# Patient Record
Sex: FEMALE | Race: WHITE | Employment: FULL TIME | URBAN - METROPOLITAN AREA
[De-identification: names, ages, dates, MRNs, and addresses within clinical notes are randomized per-mention and may not be internally consistent; named-entity substitution may affect disease eponyms.]

---

## 2017-06-06 ENCOUNTER — APPOINTMENT (OUTPATIENT)
Dept: PHYSICAL THERAPY | Age: 37
End: 2017-06-06
Payer: COMMERCIAL

## 2017-06-07 ENCOUNTER — HOSPITAL ENCOUNTER (OUTPATIENT)
Dept: PHYSICAL THERAPY | Age: 37
Discharge: HOME | End: 2017-06-07
Payer: COMMERCIAL

## 2017-06-14 ENCOUNTER — HOSPITAL ENCOUNTER (OUTPATIENT)
Dept: PHYSICAL THERAPY | Age: 37
Discharge: HOME | End: 2017-06-14
Payer: COMMERCIAL

## 2017-06-14 PROCEDURE — 97162 PT EVAL MOD COMPLEX 30 MIN: CPT

## 2017-06-15 NOTE — PROGRESS NOTES
Emily Forte  : 1980 [facility] [address]  [phone]   [phone]        OUTPATIENT PHYSICAL THERAPY:Initial Assessment 2017    ICD-10: Treatment Diagnosis: low back pain M54.5  ICD-10: Treatment Diagnosis: difficulty walking R26.2  ICD-10: Treatment Diagnosis: stiffness in joint; right foot M25.674  Precautions/Allergies:   Sulfa (sulfonamide antibiotics)   Fall Risk Score: 0 (? 5 = High Risk)  MD Orders: self referred MEDICAL/REFERRING DIAGNOSIS:  Foot pain [M79.673]   DATE OF ONSET: chronic  REFERRING PHYSICIAN: Referred, Self, MD  RETURN PHYSICIAN APPOINTMENT: as needed     INITIAL ASSESSMENT:  Ms. Concha Urena presents to physical therapy with history of low back pain/right buttock pain and antalgic gait. She presents with limitations in RLE ROM, especially into her foot and ankle, with limited dorsiflexion. She has a history of a cavernous brainstem resulting in challenges with balance and gait, however patient is very motivated and demonstrates great strength through UE and LE. She is planning to join the [employer] and has one more orthopedic test and wants to work with physical therapy to improve her max potential. She would benefit from skilled physical therapy to improve her overall mobility, stability, balance and neuromuscular facilitation. PROBLEM LIST (Impacting functional limitations):  1. Decreased Strength  2. Decreased ADL/Functional Activities  3. Decreased Ambulation Ability/Technique  4. Decreased Balance INTERVENTIONS PLANNED:  1. Balance Exercise  2. Home Exercise Program (HEP)  3. Manual Therapy  4. Neuromuscular Re-education/Strengthening  5. Range of Motion (ROM)  6. Therapeutic Activites  7. Therapeutic Exercise/Strengthening   TREATMENT PLAN:  Effective Dates: 17 TO 17.   Frequency/Duration: 2 times a week for 4 weeks, (may only attend 1x a week secondary to her and therapist schedule/availability)  GOALS: (Goals have been discussed and agreed upon with patient.)  Discharge Goals: Time Frame: 4 weeks  1. Patient demonstrates improved balance with ability to stand for 60 seconds single leg without loss of balance. 2. Patient able to to demonstrates improved pelvic mobility with gait, for 300' without verbal cueing or resistance from therapist.  3. Patient demonstrated ability to run 2 miles without onset of discomfort or loss of balance. 4. Patient able to perform balance exercises for 15 minutes with no more than 3 loss of balances. Rehabilitation Potential For Stated Goals: Excellent  Regarding Anusha Forte's therapy, I certify that the treatment plan above will be carried out by a therapist or under their direction. Thank you for this referral,  Yuliana Humphreys PT     Referring Physician Signature: Referred, Self, MD              Date                    The information in this section was collected on 6/14/17 (except where otherwise noted). HISTORY:   History of Present Injury/Illness (Reason for Referral):  Patient has history of a cavernous brainstem, history of stroke affecting her right side. She has a chronic history of intermittent low back pain and challenges with mobility into right foot/ankle. She is very motivated and performs daily exercises and strength training to improve her overall mobility and function. She has decided she wants to try out to join the [employer] and to date has passed 99% of her testing. She has one more test on 6/19/17 and wanted to come to therapy to continue to improve her mobility and strength, especially into her RLE. Patient denies dizziness, drop attacks, numbness/tingling, bowel/bladder dysfunction and/or unexplained weight gain/loss. Past Medical History/Comorbidities:   Ms. Edward Villegas  has a past medical history of Stroke [facility]).  She also has no past medical history of Abnormal glandular Papanicolaou smear of cervix; Acquired hypothyroidism; Adult physical abuse; Anemia NEC; Asthma; Chronic kidney disease, unspecified; Diabetes mellitus; Essential hypertension; Female infertility of unspecified origin; Heart abnormalities; Herpes simplex without mention of complication; Human immunodeficiency virus (HIV) disease ([facility] [address] OTHER MEDICAL; Liver disease; Phlebitis and thrombophlebitis of unspecified site; Postpartum depression; Psychiatric problem; Rhesus isoimmunization unspecified as to episode of care in pregnancy; Shock due to anesthesia not elsewhere classified; Sickle-cell disease, unspecified ([facility] [address] Systemic lupus erythematosus ([facility] [address] Unspecified breast disorder; Unspecified diseases of blood and blood-forming organs; or Unspecified epilepsy [facility] of [facility] Ms. Nurys Allen  has a past surgical history that includes neurological procedure unlisted. Social History/Living Environment:     Lives in a private home with her  and two children  Prior Level of Function/Work/Activity:  Patient is very active with daily workouts and running. She works full time with [employer]. Dominant Side:         BILATERAL  Current Medications:       Current Outpatient Prescriptions:     ibuprofen (MOTRIN) 400 mg tablet, take 1 Tab by mouth every four (4) hours as needed for Pain., Disp: 30 Tab, Rfl: 1     Date Last Reviewed:  6/14/2017     Number of Personal Factors/Comorbidities that affect the Plan of Care: 1-2: MODERATE COMPLEXITY   EXAMINATION:   Observation/Orthostatic Postural Assessment:          Patient denies any LE pain, paresthesia or symptoms. Patient denies any increase of symptoms with cough, sneeze or valsalva. Patient denies any saddle paresthesia or bowel/bladder deficits. Observation/Orthostatic Postural Assessment: Patient exhibits a increased lumbar lordosis and convex right type I curve in mid-thoracic spine. Lower extremity weight bearing is slight increased left. Observation of gait indicates challenges with pelvic mobility  (patient is a pelvic/hip walker versus an efficient trunk walker). Lower quadrant bony landmarks are right elevated compared to left. Leg swing for innominate mobility indicates bilateral limitations into innominate extension. Vertical compression test (VCT) for alignment is 4-. Elbow flexion test (EFT) for motor activation is 3. Soft tissue observation indicates restrictions noted in bilateral iliopsoas, hamstrings, right greater than left, piriformis right greater than left. Palpation/Tissue, Texture, Tension, Tonal and Length Abnormalities: Myofascial assessment indicates restrictions into thoracolumbar fascia. Muscle length testing in modified Harley position exhibits restricted right greater than left. Hamstring flexibility tested supine with straight leg raise (SLR) is restricted right greater than left. Piriformis length is restricted right greater than left. Quadriceps flexibility tested prone with heel to buttock is moderate restrictions right. Ely test is positive for rectus femoris tightness. Gastrocnemius and soleus flexibility are restricted right. Sacrotuberous ligament is right restrictions. Sacrococcygeal junction exhibits right rotated. Body of coccyx is flexed. ROM: Passive trunk rotation is 90% available to the R and 90% available to the L. Kinetic testing in standing including forward bend test is positive left. Marcher's test is positive left. Pelvic shear test is standing exhibits decreased excursion of bilateral shear with a hard end feel. Single plane active movements through lumbar spine in standing exhibit WFL, tightness into hamstrings, right greater than left. Functional squat for LE clearance is WFL.     AROM (PROM) Right Left   Hip flexion/Innominate flexion 90 90   Hip extension/Innominate extension 8 8   Hip external rotation (ER) 25 25   Hip internal rotation (IR) 20 20   Hip abduction 45 45   Strength: Lumbar protective mechanism (LPM) are present for initiation. LPM Strength */5   R anterior to posterior diagonal 3   L anterior to posterior diagonal 3   R posterior to anterior diagonal 4   L posterior to anterior diagonal 4     Manual Muscle Test (*/5) Right Left   Knee extension 4+ 5   Knee flexion 4+ 5   Hip flexion 4 5   Hip ER 4 5   Hip IR 4 5   Hip extension 4- 4   Hip abduction 4- 4   Hip adduction 5 5   Ankle DF 4- 5   Ankle PF 5 5   Core stability 4/5     Special Tests:  Darryn Amado test is negative. Scours test is negative  Neurological Screen:  Myotomes: Key muscle strength testing through bilateral LE is intact. Dermatomes: Sensation testing through bilateral lower quadrants for light touch is intact. Reflexes: Patellar (L4) and Achilles (S1) are 2+ and WFL. Neural tension tests: Passive straight leg raise (SLR) test is negative. Slump test is negative. Functional Mobility:  Overall demonstrates good mobility with functional tasks, challenged with balance in single limb stance       Body Structures Involved:  1. Nerves  2. Joints  3. Muscles Body Functions Affected:  1. Sensory/Pain  2. Neuromusculoskeletal  3. Movement Related Activities and Participation Affected:  1. Mobility  2. Community, Social and [place]   Number of elements (examined above) that affect the Plan of Care: 4+: HIGH COMPLEXITY   CLINICAL PRESENTATION:   Presentation: Evolving clinical presentation with changing clinical characteristics: MODERATE COMPLEXITY   CLINICAL DECISION MAKING:   Outcome Measure: Tool Used: Modified Oswestry Low Back Pain Questionnaire  Score:  Initial: 3/50  Most Recent: X/50 (Date: -- )   Interpretation of Score: Each section is scored on a 0-5 scale, 5 representing the greatest disability. The scores of each section are added together for a total score of 50.       Medical Necessity:   · Patient is expected to demonstrate progress in strength, range of motion, balance, coordination and functional technique to increase independence with ambulation and balance to prepare for her testing for the [facility] Reason for Services/Other Comments:  · Patient continues to require skilled intervention due to she would benefit from additional training in endurance of RLE and lumbar spine  and to improve overall mobility, stability and balance. .   Use of outcome tool(s) and clinical judgement create a POC that gives a: Questionable prediction of patient's progress: MODERATE COMPLEXITY            TREATMENT:   (In addition to Assessment/Re-Assessment sessions the following treatments were rendered)  Pre-treatment Symptoms/Complaints:  Patient has passed 99% of her requirements for her to enter the [facility], want to come to therapy to continue to improve her ability to perform stability and balance activities. Pain: Initial:   Pain Intensity 1: 4  Pain Location 1: Buttocks  Pain Orientation 1: Right  Post Session:  3/10     Assessment only today, no treatment provided. Treatment/Session Assessment:    · Response to Treatment:  Improved awareness of plan of care and activities we can address in therapy to improve stability, balance and neuromuscular components. · Compliance with Program/Exercises: compliant all of the time. · Recommendations/Intent for next treatment session: \"Next visit will focus on advancements to more challenging activities\".   Total Treatment Duration:  PT Patient Time In/Time Out  Time In: 0840  Time Out: One [facility] Olivia PT

## 2017-06-15 NOTE — PROGRESS NOTES
Ambulatory/Rehab Services H2 Model Falls Risk Assessment    Risk Factor Pts. ·   Confusion/Disorientation/Impulsivity  []    4 ·   Symptomatic Depression  []   2 ·   Altered Elimination  []   1 ·   Dizziness/Vertigo  []   1 ·   Gender (Male)  []   1 ·   Any administered antiepileptics (anticonvulsants):  []   2 ·   Any administered benzodiazepines:  []   1 ·   Visual Impairment (specify):  []   1 ·   Portable Oxygen Use  []   1 ·   Orthostatic ? BP  []   1 ·   History of Recent Falls (within 3 mos.)  []   5     Ability to Rise from Chair (choose one) Pts. ·   Ability to rise in a single movement  [x]   0 ·   Pushes up, successful in one attempt  []   1 ·   Multiple attempts, but successful  []   3 ·   Unable to rise without assistance  []   4   Total: (5 or greater = High Risk) 0     Falls Prevention Plan:   []                Physical Limitations to Exercise (specify):   []                Mobility Assistance Device (type):   []                Exercise/Equipment Adaptation (specify):    ©2010 [facility] of [address] Patent #[id number].  Federal Law prohibits the replication, distribution or use without written permission from [facility] [employer]

## 2017-06-20 ENCOUNTER — HOSPITAL ENCOUNTER (OUTPATIENT)
Dept: PHYSICAL THERAPY | Age: 37
Discharge: HOME | End: 2017-06-20
Payer: COMMERCIAL

## 2017-07-19 ENCOUNTER — HOSPITAL ENCOUNTER (OUTPATIENT)
Dept: PHYSICAL THERAPY | Age: 37
Discharge: HOME | End: 2017-07-19
Payer: COMMERCIAL

## 2017-07-19 PROCEDURE — 97140 MANUAL THERAPY 1/> REGIONS: CPT

## 2017-07-19 PROCEDURE — 97110 THERAPEUTIC EXERCISES: CPT

## 2017-07-20 NOTE — PROGRESS NOTES
Renita Forte  : 1980 [facility] [address]  [phone]   [phone]        OUTPATIENT PHYSICAL THERAPY:Daily Note and Recertification [id number]    ICD-10: Treatment Diagnosis: low back pain M54.5  ICD-10: Treatment Diagnosis: difficulty walking R26.2  ICD-10: Treatment Diagnosis: stiffness in joint; right foot M25.674  Precautions/Allergies:   Sulfa (sulfonamide antibiotics)   Fall Risk Score: 0 (? 5 = High Risk)  MD Orders: self referred MEDICAL/REFERRING DIAGNOSIS:  Foot pain [M79.673]   DATE OF ONSET: chronic  REFERRING PHYSICIAN: Madisyn De La Fuente MD  RETURN PHYSICIAN APPOINTMENT: as needed     RE-ASSESSMENT:  Ms. Lieutenant Moffett presents to physical therapy with history of low back pain/right buttock pain and antalgic gait. She presents with limitations in RLE ROM, especially into her foot and ankle, with limited dorsiflexion. She has a history of a cavernous brainstem resulting in challenges with balance and gait, however patient is very motivated and demonstrates great strength through UE and LE. She would benefit from skilled physical therapy to improve her overall mobility, stability, balance and neuromuscular facilitation. PROBLEM LIST (Impacting functional limitations):  1. Decreased Strength  2. Decreased ADL/Functional Activities  3. Decreased Ambulation Ability/Technique  4. Decreased Balance INTERVENTIONS PLANNED:  1. Balance Exercise  2. Home Exercise Program (HEP)  3. Manual Therapy  4. Neuromuscular Re-education/Strengthening  5. Range of Motion (ROM)  6. Therapeutic Activites  7. Therapeutic Exercise/Strengthening   TREATMENT PLAN:  Effective Dates: 17 TO 10/11/17  Frequency/Duration: 1x a week for 12 weeks  GOALS: (Goals have been discussed and agreed upon with patient.)  Discharge Goals: Time Frame: 4 weeks  1.  Patient demonstrates improved balance with ability to stand for 60 seconds single leg without loss of balance. - ONGOING  2. Patient able to to demonstrates improved pelvic mobility with gait, for 300' without verbal cueing or resistance from therapist. - ONGOING  3. Patient demonstrated ability to run 2 miles without onset of discomfort or loss of balance. - ONGOING  4. Patient able to perform balance exercises for 15 minutes with no more than 3 loss of balances. Rehabilitation Potential For Stated Goals: Excellent  Regarding Nicole Forte's therapy, I certify that the treatment plan above will be carried out by a therapist or under their direction. Thank you for this referral,  Jose Saez PT     Referring Physician Signature: Irene Colvin MD              Date                    The information in this section was collected on 7/19/17 (except where otherwise noted). HISTORY:   History of Present Injury/Illness (Reason for Referral):  Patient has history of a cavernous brainstem, history of stroke affecting her right side. She has a chronic history of intermittent low back pain and challenges with mobility into right foot/ankle. She is very motivated and performs daily exercises and strength training to improve her overall mobility and function. She has decided she wants to try out to join the [employer] and to date has passed 99% of her testing. :Unfortunately she was denied by the the [employer] reserves, however she continues to want to improve her overall strength and balance. She has also expressed interest in obtaining a new AFO. Patient denies dizziness, drop attacks, numbness/tingling, bowel/bladder dysfunction and/or unexplained weight gain/loss. Past Medical History/Comorbidities:   Ms. Manuel Kumar  has a past medical history of Stroke [facility]). She also has no past medical history of Abnormal glandular Papanicolaou smear of cervix; Acquired hypothyroidism; Adult physical abuse; Anemia NEC;  Asthma; Chronic kidney disease, unspecified; Diabetes mellitus; Essential hypertension; Female infertility of unspecified origin; Heart abnormalities; Herpes simplex without mention of complication; Human immunodeficiency virus (HIV) disease ([facility] [address] OTHER MEDICAL; Liver disease; Phlebitis and thrombophlebitis of unspecified site; Postpartum depression; Psychiatric problem; Rhesus isoimmunization unspecified as to episode of care in pregnancy; Shock due to anesthesia not elsewhere classified; Sickle-cell disease, unspecified ([facility] [address] Systemic lupus erythematosus ([facility] [address] Unspecified breast disorder; Unspecified diseases of blood and blood-forming organs; or Unspecified epilepsy without mention of intractable epilepsy ([facility] [address] Ms. Kay Kat  has a past surgical history that includes neurological procedure unlisted. Social History/Living Environment:     Lives in a private home with her  and two children  Prior Level of Function/Work/Activity:  Patient is very active with daily workouts and running. She works full time with [employer]. Dominant Side:         BILATERAL  Current Medications:       Current Outpatient Prescriptions:     ibuprofen (MOTRIN) 400 mg tablet, take 1 Tab by mouth every four (4) hours as needed for Pain., Disp: 30 Tab, Rfl: 1     Date Last Reviewed:  7/19/2017     Number of Personal Factors/Comorbidities that affect the Plan of Care: 1-2: MODERATE COMPLEXITY   EXAMINATION:   Observation/Orthostatic Postural Assessment:          Patient denies any LE pain, paresthesia or symptoms. Patient denies any increase of symptoms with cough, sneeze or valsalva. Patient denies any saddle paresthesia or bowel/bladder deficits. Observation/Orthostatic Postural Assessment: Patient exhibits a increased lumbar lordosis and convex right type I curve in mid-thoracic spine. Lower extremity weight bearing is slight increased left. Observation of gait indicates challenges with pelvic mobility  (patient is a pelvic/hip walker versus an efficient trunk walker).  Lower quadrant bony landmarks are right elevated compared to left. Leg swing for innominate mobility indicates bilateral limitations into innominate extension. Vertical compression test (VCT) for alignment is 4-. Elbow flexion test (EFT) for motor activation is 3. Soft tissue observation indicates restrictions noted in bilateral iliopsoas, hamstrings, right greater than left, piriformis right greater than left. Palpation/Tissue, Texture, Tension, Tonal and Length Abnormalities: Myofascial assessment indicates restrictions into thoracolumbar fascia. Muscle length testing in modified Harley position exhibits restricted right greater than left. Hamstring flexibility tested supine with straight leg raise (SLR) is restricted right greater than left. Piriformis length is restricted right greater than left. Quadriceps flexibility tested prone with heel to buttock is moderate restrictions right. Ely test is positive for rectus femoris tightness. Gastrocnemius and soleus flexibility are restricted right. Sacrotuberous ligament is right restrictions. Sacrococcygeal junction exhibits right rotated. Body of coccyx is flexed. ROM: Passive trunk rotation is 90% available to the R and 90% available to the L. Kinetic testing in standing including forward bend test is positive left. Marcher's test is positive left. Pelvic shear test is standing exhibits decreased excursion of bilateral shear with a hard end feel. Single plane active movements through lumbar spine in standing exhibit WFL, tightness into hamstrings, right greater than left. Functional squat for LE clearance is WFL. AROM (PROM) Right Left   Hip flexion/Innominate flexion 90 90   Hip extension/Innominate extension 8 8   Hip external rotation (ER) 25 25   Hip internal rotation (IR) 20 20   Hip abduction 45 45   Strength: Lumbar protective mechanism (LPM) are present for initiation.    LPM Strength */5   R anterior to posterior diagonal 3   L anterior to posterior diagonal 3   R posterior to anterior diagonal 4   L posterior to anterior diagonal 4     Manual Muscle Test (*/5) Right Left   Knee extension 4+ 5   Knee flexion 4+ 5   Hip flexion 4 5   Hip ER 4 5   Hip IR 4 5   Hip extension 4- 4   Hip abduction 4- 4   Hip adduction 5 5   Ankle DF 4- 5   Ankle PF 5 5   Core stability 4/5     Special Tests:  Milly Wayne test is negative. Scours test is negative  Neurological Screen:  Myotomes: Key muscle strength testing through bilateral LE is intact. Dermatomes: Sensation testing through bilateral lower quadrants for light touch is intact. Reflexes: Patellar (L4) and Achilles (S1) are 2+ and WFL. Neural tension tests: Passive straight leg raise (SLR) test is negative. Slump test is negative. Functional Mobility:  Overall demonstrates good mobility with functional tasks, challenged with balance in single limb stance       Body Structures Involved:  1. Nerves  2. Joints  3. Muscles Body Functions Affected:  1. Sensory/Pain  2. Neuromusculoskeletal  3. Movement Related Activities and Participation Affected:  1. Mobility  2. Community, Social and [place]   Number of elements (examined above) that affect the Plan of Care: 4+: HIGH COMPLEXITY   CLINICAL PRESENTATION:   Presentation: Evolving clinical presentation with changing clinical characteristics: MODERATE COMPLEXITY   CLINICAL DECISION MAKING:   Outcome Measure: Tool Used: Modified Oswestry Low Back Pain Questionnaire  Score:  Initial: 3/50  Most Recent: X/50 (Date: -- )   Interpretation of Score: Each section is scored on a 0-5 scale, 5 representing the greatest disability. The scores of each section are added together for a total score of 50. Medical Necessity:   · Patient is expected to demonstrate progress in strength, range of motion, balance, coordination and functional technique to increase independence with ambulation and balance to prepare for her testing for the [facility]   Reason for Services/Other Comments:  · Patient continues to require skilled intervention due to she would benefit from additional training in endurance of RLE and lumbar spine  and to improve overall mobility, stability and balance. .   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 She continues to note some tightness into her right LE and buttocks. She is also interested in a new AFO. Pain: Initial:   Pain Intensity 1: 4  Pain Location 1: Buttocks  Pain Orientation 1: Right  Post Session:  3/10     Therapeutic Exercise: (15 Minutes):  Exercises per grid below to improve mobility, strength, balance and coordination. Required minimal verbal and manual cues to promote proper body alignment, promote proper body posture, promote proper body mechanics and promote proper body breathing techniques. Progressed resistance, range, repetitions and complexity of movement as indicated. Date:  7/19/2017   Activity/Exercise Parameters   Balance exercises X 10 reps, tandem stance, with weight shift and weight acceptance into front LE   Single limb stance  3x30 sec holds   Quadriped posterior depression X 10 reps, 10 sec holds ball into wall   Treadmill ambulation X 3 minute of hip hinging forward walking with focus on posterior depression of pelvis. Review HEP X 5 minutes             Manual Therapy (    Soft Tissue Mobilization Duration  Duration: 45 Minutes): Manual techniques to facilitate improved motion and decreased pain.   · Supine bilateral iliopsoas release with FMP of lower trunk rotation  · Supine bilateral hamstrings, soft tissue mobilization right greater than left  · Prone sacral mobilizations, p/a with lower trunk rotation  · With written consent received and precautions reviewed, instrument-assisted soft tissue mobilization was performed to right gluteus medius, minimus and piriformis for the purpose of activation with a positive treatment effect and no complications noted. · Side lying left for right prolonged holds into posterior depression and manual resistance into anterior elevation of pelvis, with hip flexion, hip adduction and ER    (Used abbreviations: MET - muscle energy technique; PNF - proprioceptive neuromuscular facilitation; NMR - neuromuscular re-education; a/p - anterior to posterior; p/a - posterior to anterior, FMP - functional movement patterns)        Treatment/Session Assessment:    · Response to Treatment: improved activation of right gluteal muscles and improved endurance through pelvis. · Compliance with Program/Exercises: compliant all of the time. · Recommendations/Intent for next treatment session: \"Next visit will focus on advancements to more challenging activities\".   Total Treatment Duration:  PT Patient Time In/Time Out  Time In: 0730  Time Out: 1420 [facility] [place], PT

## 2017-07-25 ENCOUNTER — HOSPITAL ENCOUNTER (OUTPATIENT)
Dept: PHYSICAL THERAPY | Age: 37
Discharge: HOME | End: 2017-07-25
Payer: COMMERCIAL

## 2017-07-25 PROCEDURE — 97140 MANUAL THERAPY 1/> REGIONS: CPT

## 2017-07-25 PROCEDURE — 97110 THERAPEUTIC EXERCISES: CPT

## 2017-07-25 NOTE — PROGRESS NOTES
[facility] Forte  : 1980 [facility] [address]  [phone]   [phone]        OUTPATIENT PHYSICAL THERAPY:Daily Note 2017    ICD-10: Treatment Diagnosis: low back pain M54.5  ICD-10: Treatment Diagnosis: difficulty walking R26.2  ICD-10: Treatment Diagnosis: stiffness in joint; right foot M25.674  Precautions/Allergies:   Sulfa (sulfonamide antibiotics)   Fall Risk Score: 0 (? 5 = High Risk)  MD Orders: self referred MEDICAL/REFERRING DIAGNOSIS:  Foot pain [M79.673]   DATE OF ONSET: chronic  REFERRING PHYSICIAN: Andrew Millard MD  RETURN PHYSICIAN APPOINTMENT: as needed     RE-ASSESSMENT:  Ms. Opal Painter presents to physical therapy with history of low back pain/right buttock pain and antalgic gait. She presents with limitations in RLE ROM, especially into her foot and ankle, with limited dorsiflexion. She has a history of a cavernous brainstem resulting in challenges with balance and gait, however patient is very motivated and demonstrates great strength through UE and LE. She would benefit from skilled physical therapy to improve her overall mobility, stability, balance and neuromuscular facilitation. PROBLEM LIST (Impacting functional limitations):  1. Decreased Strength  2. Decreased ADL/Functional Activities  3. Decreased Ambulation Ability/Technique  4. Decreased Balance INTERVENTIONS PLANNED:  1. Balance Exercise  2. Home Exercise Program (HEP)  3. Manual Therapy  4. Neuromuscular Re-education/Strengthening  5. Range of Motion (ROM)  6. Therapeutic Activites  7. Therapeutic Exercise/Strengthening   TREATMENT PLAN:  Effective Dates: 17 TO 10/11/17  Frequency/Duration: 1x a week for 12 weeks  GOALS: (Goals have been discussed and agreed upon with patient.)  Discharge Goals: Time Frame: 4 weeks  1.  Patient demonstrates improved balance with ability to stand for 60 seconds single leg without loss of balance. - ONGOING  2. Patient able to to demonstrates improved pelvic mobility with gait, for 300' without verbal cueing or resistance from therapist. - ONGOING  3. Patient demonstrated ability to run 2 miles without onset of discomfort or loss of balance. - ONGOING  4. Patient able to perform balance exercises for 15 minutes with no more than 3 loss of balances. Rehabilitation Potential For Stated Goals: Excellent  Regarding Ashwin Forte's therapy, I certify that the treatment plan above will be carried out by a therapist or under their direction. Thank you for this referral,  Efraín Gonzlaes PT     Referring Physician Signature: Bianca Moura MD              Date                    The information in this section was collected on 7/19/17 (except where otherwise noted). HISTORY:   History of Present Injury/Illness (Reason for Referral):  Patient has history of a cavernous brainstem, history of stroke affecting her right side. She has a chronic history of intermittent low back pain and challenges with mobility into right foot/ankle. She is very motivated and performs daily exercises and strength training to improve her overall mobility and function. She has decided she wants to try out to join the [employer] and to date has passed 99% of her testing. :Unfortunately she was denied by the the [employer] reserves, however she continues to want to improve her overall strength and balance. She has also expressed interest in obtaining a new AFO. Patient denies dizziness, drop attacks, numbness/tingling, bowel/bladder dysfunction and/or unexplained weight gain/loss. Past Medical History/Comorbidities:   Ms. Richmond Moreno  has a past medical history of Stroke [facility]). She also has no past medical history of Abnormal glandular Papanicolaou smear of cervix; Acquired hypothyroidism; Adult physical abuse; Anemia NEC;  Asthma; Chronic kidney disease, unspecified; Diabetes mellitus; Essential hypertension; Female infertility of unspecified origin; Heart abnormalities; Herpes simplex without mention of complication; Human immunodeficiency virus (HIV) disease ([facility] OTHER MEDICAL; Liver disease; Phlebitis and thrombophlebitis of unspecified site; Postpartum depression; Psychiatric problem; Rhesus isoimmunization unspecified as to episode of care in pregnancy; Shock due to anesthesia not elsewhere classified; Sickle-cell disease, unspecified ([facility] [address] Systemic lupus erythematosus ([facility] [address] Unspecified breast disorder; Unspecified diseases of blood and blood-forming organs; or Unspecified epilepsy without mention of intractable epilepsy ([facility] Ms. Kristy Saucedo  has a past surgical history that includes neurological procedure unlisted. Social History/Living Environment:     Lives in a private home with her  and two children  Prior Level of Function/Work/Activity:  Patient is very active with daily workouts and running. She works full time with [employer]. Dominant Side:         BILATERAL  Current Medications:       Current Outpatient Prescriptions:     ibuprofen (MOTRIN) 400 mg tablet, take 1 Tab by mouth every four (4) hours as needed for Pain., Disp: 30 Tab, Rfl: 1     Date Last Reviewed:  7/25/2017     Number of Personal Factors/Comorbidities that affect the Plan of Care: 1-2: MODERATE COMPLEXITY   EXAMINATION:   Observation/Orthostatic Postural Assessment:          Patient denies any LE pain, paresthesia or symptoms. Patient denies any increase of symptoms with cough, sneeze or valsalva. Patient denies any saddle paresthesia or bowel/bladder deficits. Observation/Orthostatic Postural Assessment: Patient exhibits a increased lumbar lordosis and convex right type I curve in mid-thoracic spine. Lower extremity weight bearing is slight increased left. Observation of gait indicates challenges with pelvic mobility  (patient is a pelvic/hip walker versus an efficient trunk walker).  Lower quadrant bony landmarks are right elevated compared to left. Leg swing for innominate mobility indicates bilateral limitations into innominate extension. Vertical compression test (VCT) for alignment is 4-. Elbow flexion test (EFT) for motor activation is 3. Soft tissue observation indicates restrictions noted in bilateral iliopsoas, hamstrings, right greater than left, piriformis right greater than left. Palpation/Tissue, Texture, Tension, Tonal and Length Abnormalities: Myofascial assessment indicates restrictions into thoracolumbar fascia. Muscle length testing in modified Harley position exhibits restricted right greater than left. Hamstring flexibility tested supine with straight leg raise (SLR) is restricted right greater than left. Piriformis length is restricted right greater than left. Quadriceps flexibility tested prone with heel to buttock is moderate restrictions right. Ely test is positive for rectus femoris tightness. Gastrocnemius and soleus flexibility are restricted right. Sacrotuberous ligament is right restrictions. Sacrococcygeal junction exhibits right rotated. Body of coccyx is flexed. ROM: Passive trunk rotation is 90% available to the R and 90% available to the L. Kinetic testing in standing including forward bend test is positive left. Marcher's test is positive left. Pelvic shear test is standing exhibits decreased excursion of bilateral shear with a hard end feel. Single plane active movements through lumbar spine in standing exhibit WFL, tightness into hamstrings, right greater than left. Functional squat for LE clearance is WFL. AROM (PROM) Right Left   Hip flexion/Innominate flexion 90 90   Hip extension/Innominate extension 8 8   Hip external rotation (ER) 25 25   Hip internal rotation (IR) 20 20   Hip abduction 45 45   Strength: Lumbar protective mechanism (LPM) are present for initiation.    LPM Strength */5   R anterior to posterior diagonal 3   L anterior to posterior diagonal 3   R posterior to anterior diagonal 4   L posterior to anterior diagonal 4     Manual Muscle Test (*/5) Right Left   Knee extension 4+ 5   Knee flexion 4+ 5   Hip flexion 4 5   Hip ER 4 5   Hip IR 4 5   Hip extension 4- 4   Hip abduction 4- 4   Hip adduction 5 5   Ankle DF 4- 5   Ankle PF 5 5   Core stability 4/5     Special Tests:  Cro[place] S[place] test is negative. Scours test is negative  Neurological Screen:  Myotomes: Key muscle strength testing through bilateral LE is intact. Dermatomes: Sensation testing through bilateral lower quadrants for light touch is intact. Reflexes: Patellar (L4) and Achilles (S1) are 2+ and WFL. Neural tension tests: Passive straight leg raise (SLR) test is negative. Slump test is negative. Functional Mobility:  Overall demonstrates good mobility with functional tasks, challenged with balance in single limb stance       Body Structures Involved:  1. Nerves  2. Joints  3. Muscles Body Functions Affected:  1. Sensory/Pain  2. Neuromusculoskeletal  3. Movement Related Activities and Participation Affected:  1. Mobility  2. Community, Social and [place]   Number of elements (examined above) that affect the Plan of Care: 4+: HIGH COMPLEXITY   CLINICAL PRESENTATION:   Presentation: Evolving clinical presentation with changing clinical characteristics: MODERATE COMPLEXITY   CLINICAL DECISION MAKING:   Outcome Measure: Tool Used: Modified Oswestry Low Back Pain Questionnaire  Score:  Initial: 3/50  Most Recent: X/50 (Date: -- )   Interpretation of Score: Each section is scored on a 0-5 scale, 5 representing the greatest disability. The scores of each section are added together for a total score of 50. Medical Necessity:   · Patient is expected to demonstrate progress in strength, range of motion, balance, coordination and functional technique to increase independence with ambulation and balance to prepare for her testing for the [facility]   Reason for Services/Other Comments:  · Patient continues to require skilled intervention due to she would benefit from additional training in endurance of RLE and lumbar spine  and to improve overall mobility, stability and balance. .   Use of outcome tool(s) and clinical judgement create a POC that gives a: Questionable prediction of patient's progress: MODERATE COMPLEXITY            TREATMENT:   (In addition to Assessment/Re-Assessment sessions the following treatments were rendered)  Pre-treatment Symptoms/Complaints:  Patient notes she was able to run for longer duration and improved right ankle dorsiflexion and mobility through RLE after last session. Pain: Initial:   Pain Intensity 1: 3  Pain Location 1: Buttocks  Pain Orientation 1: Right  Post Session:  3/10     Therapeutic Exercise: (10 Minutes):  Exercises per grid below to improve mobility, strength, balance and coordination. Required minimal verbal and manual cues to promote proper body alignment, promote proper body posture, promote proper body mechanics and promote proper body breathing techniques. Progressed resistance, range, repetitions and complexity of movement as indicated. Date:  7/25/2017   Activity/Exercise Parameters   Balance exercises    Single limb stance     Quadriped posterior depression X 10 reps, 10 sec holds ball into wall   Treadmill ambulation    Review HEP X 5 minutes   Side lying left - right hip extension 5 reps, 15 sec holds prolonged holds into hip abduction with pelvic posterior depression         Manual Therapy (    Soft Tissue Mobilization Duration  Duration: 50 Minutes): Manual techniques to facilitate improved motion and decreased pain.   · Supine bilateral iliopsoas release with FMP of lower trunk rotation  · Supine bilateral hamstrings, soft tissue mobilization right greater than left  · Prone sacral mobilizations, p/a with lower trunk rotation  · With written consent received and precautions reviewed, instrument-assisted soft tissue mobilization was performed to right gluteus medius, minimus and piriformis for the purpose of activation with a positive treatment effect and no complications noted. · Side lying left for right prolonged holds into posterior depression and manual resistance into anterior elevation of pelvis, with hip flexion, hip adduction and ER    (Used abbreviations: MET - muscle energy technique; PNF - proprioceptive neuromuscular facilitation; NMR - neuromuscular re-education; a/p - anterior to posterior; p/a - posterior to anterior, FMP - functional movement patterns)        Treatment/Session Assessment:    · Response to Treatment: improved hamstring mobility at end of session, improved prolonged holds into right pelvic posterior depression. · Compliance with Program/Exercises: compliant all of the time. · Recommendations/Intent for next treatment session: \"Next visit will focus on advancements to more challenging activities\".   Total Treatment Duration:  PT Patient Time In/Time Out  Time In: 1430  Time Out: [address] [facility] Dalia Valdez, ANGELA

## 2017-08-01 ENCOUNTER — HOSPITAL ENCOUNTER (OUTPATIENT)
Dept: PHYSICAL THERAPY | Age: 37
Discharge: HOME | End: 2017-08-01
Payer: COMMERCIAL

## 2017-08-01 PROCEDURE — 97140 MANUAL THERAPY 1/> REGIONS: CPT

## 2017-08-01 NOTE — PROGRESS NOTES
Karolyn Forte  : 1980 [facility] [address]  [phone]   [phone]        OUTPATIENT PHYSICAL THERAPY:Daily Note 2017    ICD-10: Treatment Diagnosis: low back pain M54.5  ICD-10: Treatment Diagnosis: difficulty walking R26.2  ICD-10: Treatment Diagnosis: stiffness in joint; right foot M25.674  Precautions/Allergies:   Sulfa (sulfonamide antibiotics)   Fall Risk Score: 0 (? 5 = High Risk)  MD Orders: self referred MEDICAL/REFERRING DIAGNOSIS:  Foot pain [M79.673]   DATE OF ONSET: chronic  REFERRING PHYSICIAN: Delphine Fong MD  RETURN PHYSICIAN APPOINTMENT: as needed     RE-ASSESSMENT:  Ms. Kay Kat presents to physical therapy with history of low back pain/right buttock pain and antalgic gait. She presents with limitations in RLE ROM, especially into her foot and ankle, with limited dorsiflexion. She has a history of a cavernous brainstem resulting in challenges with balance and gait, however patient is very motivated and demonstrates great strength through UE and LE. She would benefit from skilled physical therapy to improve her overall mobility, stability, balance and neuromuscular facilitation. PROBLEM LIST (Impacting functional limitations):  1. Decreased Strength  2. Decreased ADL/Functional Activities  3. Decreased Ambulation Ability/Technique  4. Decreased Balance INTERVENTIONS PLANNED:  1. Balance Exercise  2. Home Exercise Program (HEP)  3. Manual Therapy  4. Neuromuscular Re-education/Strengthening  5. Range of Motion (ROM)  6. Therapeutic Activites  7. Therapeutic Exercise/Strengthening   TREATMENT PLAN:  Effective Dates: 17 TO 10/11/17  Frequency/Duration: 1x a week for 12 weeks  GOALS: (Goals have been discussed and agreed upon with patient.)  Discharge Goals: Time Frame: 4 weeks  1.  Patient demonstrates improved balance with ability to stand for 60 seconds single leg without loss of balance. - ONGOING  2. Patient able to to demonstrates improved pelvic mobility with gait, for 300' without verbal cueing or resistance from therapist. - ONGOING  3. Patient demonstrated ability to run 2 miles without onset of discomfort or loss of balance. - ONGOING  4. Patient able to perform balance exercises for 15 minutes with no more than 3 loss of balances. Rehabilitation Potential For Stated Goals: Excellent  Regarding Simin Forte's therapy, I certify that the treatment plan above will be carried out by a therapist or under their direction. Thank you for this referral,  Airam Fulton PT     Referring Physician Signature: Bridget Milian MD              Date                    The information in this section was collected on 7/19/17 (except where otherwise noted). HISTORY:   History of Present Injury/Illness (Reason for Referral):  Patient has history of a cavernous brainstem, history of stroke affecting her right side. She has a chronic history of intermittent low back pain and challenges with mobility into right foot/ankle. She is very motivated and performs daily exercises and strength training to improve her overall mobility and function. She has decided she wants to try out to join the [employer] and to date has passed 99% of her testing. :Unfortunately she was denied by the the [employer] reserves, however she continues to want to improve her overall strength and balance. She has also expressed interest in obtaining a new AFO. Patient denies dizziness, drop attacks, numbness/tingling, bowel/bladder dysfunction and/or unexplained weight gain/loss. Past Medical History/Comorbidities:   Ms. Kevin Reid  has a past medical history of Stroke [facility]). She also has no past medical history of Abnormal glandular Papanicolaou smear of cervix; Acquired hypothyroidism; Adult physical abuse; Anemia NEC;  Asthma; Chronic kidney disease, unspecified; Diabetes mellitus; Essential hypertension; Female infertility of unspecified origin; Heart abnormalities; Herpes simplex without mention of complication; Human immunodeficiency virus (HIV) disease ([facility] [address] OTHER MEDICAL; Liver disease; Phlebitis and thrombophlebitis of unspecified site; Postpartum depression; Psychiatric problem; Rhesus isoimmunization unspecified as to episode of care in pregnancy; Shock due to anesthesia not elsewhere classified; Sickle-cell disease, unspecified ([facility] [address] Systemic lupus erythematosus ([facility] [address] Unspecified breast disorder; Unspecified diseases of blood and blood-forming organs; or Unspecified epilepsy without mention of intractable epilepsy ([facility] [address] Ms. Meño Garcia  has a past surgical history that includes neurological procedure unlisted. Social History/Living Environment:     Lives in a private home with her  and two children  Prior Level of Function/Work/Activity:  Patient is very active with daily workouts and running. She works full time with [employer]. Dominant Side:         BILATERAL  Current Medications:       Current Outpatient Prescriptions:     ibuprofen (MOTRIN) 400 mg tablet, take 1 Tab by mouth every four (4) hours as needed for Pain., Disp: 30 Tab, Rfl: 1     Date Last Reviewed:  8/1/2017     Number of Personal Factors/Comorbidities that affect the Plan of Care: 1-2: MODERATE COMPLEXITY   EXAMINATION:   Observation/Orthostatic Postural Assessment:          Patient denies any LE pain, paresthesia or symptoms. Patient denies any increase of symptoms with cough, sneeze or valsalva. Patient denies any saddle paresthesia or bowel/bladder deficits. Observation/Orthostatic Postural Assessment: Patient exhibits a increased lumbar lordosis and convex right type I curve in mid-thoracic spine. Lower extremity weight bearing is slight increased left. Observation of gait indicates challenges with pelvic mobility  (patient is a pelvic/hip walker versus an efficient trunk walker).  Lower quadrant bony landmarks are right elevated compared to left. Leg swing for innominate mobility indicates bilateral limitations into innominate extension. Vertical compression test (VCT) for alignment is 4-. Elbow flexion test (EFT) for motor activation is 3. Soft tissue observation indicates restrictions noted in bilateral iliopsoas, hamstrings, right greater than left, piriformis right greater than left. Palpation/Tissue, Texture, Tension, Tonal and Length Abnormalities: Myofascial assessment indicates restrictions into thoracolumbar fascia. Muscle length testing in modified Harley position exhibits restricted right greater than left. Hamstring flexibility tested supine with straight leg raise (SLR) is restricted right greater than left. Piriformis length is restricted right greater than left. Quadriceps flexibility tested prone with heel to buttock is moderate restrictions right. Ely test is positive for rectus femoris tightness. Gastrocnemius and soleus flexibility are restricted right. Sacrotuberous ligament is right restrictions. Sacrococcygeal junction exhibits right rotated. Body of coccyx is flexed. ROM: Passive trunk rotation is 90% available to the R and 90% available to the L. Kinetic testing in standing including forward bend test is positive left. Marcher's test is positive left. Pelvic shear test is standing exhibits decreased excursion of bilateral shear with a hard end feel. Single plane active movements through lumbar spine in standing exhibit WFL, tightness into hamstrings, right greater than left. Functional squat for LE clearance is WFL. AROM (PROM) Right Left   Hip flexion/Innominate flexion 90 90   Hip extension/Innominate extension 8 8   Hip external rotation (ER) 25 25   Hip internal rotation (IR) 20 20   Hip abduction 45 45   Strength: Lumbar protective mechanism (LPM) are present for initiation.    LPM Strength */5   R anterior to posterior diagonal 3   L anterior to posterior diagonal 3   R posterior to anterior diagonal 4   L posterior to anterior diagonal 4     Manual Muscle Test (*/5) Right Left   Knee extension 4+ 5   Knee flexion 4+ 5   Hip flexion 4 5   Hip ER 4 5   Hip IR 4 5   Hip extension 4- 4   Hip abduction 4- 4   Hip adduction 5 5   Ankle DF 4- 5   Ankle PF 5 5   Core stability 4/5     Special Tests:  Johnathan River test is negative. Scours test is negative  Neurological Screen:  Myotomes: Key muscle strength testing through bilateral LE is intact. Dermatomes: Sensation testing through bilateral lower quadrants for light touch is intact. Reflexes: Patellar (L4) and Achilles (S1) are 2+ and WFL. Neural tension tests: Passive straight leg raise (SLR) test is negative. Slump test is negative. Functional Mobility:  Overall demonstrates good mobility with functional tasks, challenged with balance in single limb stance       Body Structures Involved:  1. Nerves  2. Joints  3. Muscles Body Functions Affected:  1. Sensory/Pain  2. Neuromusculoskeletal  3. Movement Related Activities and Participation Affected:  1. Mobility  2. Community, Social and [place]   Number of elements (examined above) that affect the Plan of Care: 4+: HIGH COMPLEXITY   CLINICAL PRESENTATION:   Presentation: Evolving clinical presentation with changing clinical characteristics: MODERATE COMPLEXITY   CLINICAL DECISION MAKING:   Outcome Measure: Tool Used: Modified Oswestry Low Back Pain Questionnaire  Score:  Initial: 3/50  Most Recent: X/50 (Date: -- )   Interpretation of Score: Each section is scored on a 0-5 scale, 5 representing the greatest disability. The scores of each section are added together for a total score of 50. Medical Necessity:   · Patient is expected to demonstrate progress in strength, range of motion, balance, coordination and functional technique to increase independence with ambulation and balance to prepare for her testing for the [facility]   Reason for Services/Other Comments:  · Patient continues to require skilled intervention due to she would benefit from additional training in endurance of RLE and lumbar spine  and to improve overall mobility, stability and balance. .   Use of outcome tool(s) and clinical judgement create a POC that gives a: Questionable prediction of patient's progress: MODERATE COMPLEXITY            TREATMENT:   (In addition to Assessment/Re-Assessment sessions the following treatments were rendered)  Pre-treatment Symptoms/Complaints:  Patient notes improved mobility in RLE after last PT session, continues to have tension in right gluteus and challenged with running. Pain: Initial:   Pain Intensity 1: 3  Pain Location 1: Buttocks, Leg  Pain Orientation 1: Right  Post Session:  3/10     Therapeutic Exercise: (5 Minutes):  Exercises per grid below to improve mobility, strength, balance and coordination. Required minimal verbal and manual cues to promote proper body alignment, promote proper body posture, promote proper body mechanics and promote proper body breathing techniques. Progressed resistance, range, repetitions and complexity of movement as indicated. Date:  8/1/2017   Activity/Exercise Parameters   Balance exercises    Single limb stance     Quadriped posterior depression X 10 reps, 10 sec holds ball into wall   Treadmill ambulation    Review HEP X 5 minute review of balance and hip abduction strengthening exercises. Side lying left - right hip extension          Manual Therapy (    Soft Tissue Mobilization Duration  Duration: 40 Minutes): Manual techniques to facilitate improved motion and decreased pain.   · Supine bilateral iliopsoas release with FMP of lower trunk rotation  · Supine right hamstrings, soft tissue mobilization right greater than left  · Prone sacral mobilizations, p/a with lower trunk rotation  · With written consent received and precautions reviewed, instrument-assisted soft tissue mobilization was performed to right gluteus medius, minimus and piriformis for the purpose of activation with a positive treatment effect and no complications noted. · Side lying left for right prolonged holds into posterior depression and manual resistance into anterior elevation of pelvis, with hip flexion, hip adduction and ER    (Used abbreviations: MET - muscle energy technique; PNF - proprioceptive neuromuscular facilitation; NMR - neuromuscular re-education; a/p - anterior to posterior; p/a - posterior to anterior, FMP - functional movement patterns)        Treatment/Session Assessment:    · Response to Treatment: improved RLE mobility at end of session, decreased soft tissue restrictions in right piriformis. · Compliance with Program/Exercises: compliant all of the time. · Recommendations/Intent for next treatment session: \"Next visit will focus on advancements to more challenging activities\".   Total Treatment Duration:  PT Patient Time In/Time Out  Time In: 0730  Time Out: [address] Shekhar Cisneros, PT

## 2017-08-08 ENCOUNTER — HOSPITAL ENCOUNTER (OUTPATIENT)
Dept: PHYSICAL THERAPY | Age: 37
Discharge: HOME | End: 2017-08-08
Payer: COMMERCIAL

## 2017-08-08 PROCEDURE — 97110 THERAPEUTIC EXERCISES: CPT

## 2017-08-08 PROCEDURE — 97140 MANUAL THERAPY 1/> REGIONS: CPT

## 2017-08-08 NOTE — PROGRESS NOTES
Bertha Forte  : [facility] [address]  [phone]   [phone]        OUTPATIENT PHYSICAL THERAPY:Daily Note 2017    ICD-10: Treatment Diagnosis: low back pain M54.5  ICD-10: Treatment Diagnosis: difficulty walking R26.2  ICD-10: Treatment Diagnosis: stiffness in joint; right foot M25.674  Precautions/Allergies:   Sulfa (sulfonamide antibiotics)   Fall Risk Score: 0 (? 5 = High Risk)  MD Orders: self referred MEDICAL/REFERRING DIAGNOSIS:  Foot pain [M79.673]   DATE OF ONSET: chronic  REFERRING PHYSICIAN: Elodia Santa MD  RETURN PHYSICIAN APPOINTMENT: as needed     RE-ASSESSMENT:  Ms. Davon Nichols presents to physical therapy with history of low back pain/right buttock pain and antalgic gait. She presents with limitations in RLE ROM, especially into her foot and ankle, with limited dorsiflexion. She has a history of a cavernous brainstem resulting in challenges with balance and gait, however patient is very motivated and demonstrates great strength through UE and LE. She would benefit from skilled physical therapy to improve her overall mobility, stability, balance and neuromuscular facilitation. PROBLEM LIST (Impacting functional limitations):  1. Decreased Strength  2. Decreased ADL/Functional Activities  3. Decreased Ambulation Ability/Technique  4. Decreased Balance INTERVENTIONS PLANNED:  1. Balance Exercise  2. Home Exercise Program (HEP)  3. Manual Therapy  4. Neuromuscular Re-education/Strengthening  5. Range of Motion (ROM)  6. Therapeutic Activites  7. Therapeutic Exercise/Strengthening   TREATMENT PLAN:  Effective Dates: 17 TO 10/11/17  Frequency/Duration: 1x a week for 12 weeks  GOALS: (Goals have been discussed and agreed upon with patient.)  Discharge Goals: Time Frame: 4 weeks  1.  Patient demonstrates improved balance with ability to stand for 60 seconds single leg without loss of balance. - ONGOING  2. Patient able to to demonstrates improved pelvic mobility with gait, for 300' without verbal cueing or resistance from therapist. - ONGOING  3. Patient demonstrated ability to run 2 miles without onset of discomfort or loss of balance. - ONGOING  4. Patient able to perform balance exercises for 15 minutes with no more than 3 loss of balances. Rehabilitation Potential For Stated Goals: Excellent  Regarding Sushil Forte's therapy, I certify that the treatment plan above will be carried out by a therapist or under their direction. Thank you for this referral,  Sánchez Eddy PT     Referring Physician Signature: Maida Ledesma MD              Date                    The information in this section was collected on 7/19/17 (except where otherwise noted). HISTORY:   History of Present Injury/Illness (Reason for Referral):  Patient has history of a cavernous brainstem, history of stroke affecting her right side. She has a chronic history of intermittent low back pain and challenges with mobility into right foot/ankle. She is very motivated and performs daily exercises and strength training to improve her overall mobility and function. She has decided she wants to try out to join the [employer] and to date has passed 99% of her testing. :Unfortunately she was denied by the the [employer] reserves, however she continues to want to improve her overall strength and balance. She has also expressed interest in obtaining a new AFO. Patient denies dizziness, drop attacks, numbness/tingling, bowel/bladder dysfunction and/or unexplained weight gain/loss. Past Medical History/Comorbidities:   Ms. Amauri Price  has a past medical history of Stroke [facility]). She also has no past medical history of Abnormal glandular Papanicolaou smear of cervix; Acquired hypothyroidism; Adult physical abuse; Anemia NEC;  Asthma; Chronic kidney disease, unspecified; Diabetes mellitus; Essential hypertension; Female infertility of unspecified origin; Heart abnormalities; Herpes simplex without mention of complication; Human immunodeficiency virus (HIV) disease ([facility] [address] OTHER MEDICAL; Liver disease; Phlebitis and thrombophlebitis of unspecified site; Postpartum depression; Psychiatric problem; Rhesus isoimmunization unspecified as to episode of care in pregnancy; Shock due to anesthesia not elsewhere classified; Sickle-cell disease, unspecified ([facility] [address] Systemic lupus erythematosus ([facility] [address] Unspecified breast disorder; Unspecified diseases of blood and blood-forming organs; or Unspecified epilepsy without mention of intractable epilepsy ([facility] [address] Ms. Hermilo Salgado  has a past surgical history that includes neurological procedure unlisted. Social History/Living Environment:     Lives in a private home with her  and two children  Prior Level of Function/Work/Activity:  Patient is very active with daily workouts and running. She works full time with "[employer]. Dominant Side:         BILATERAL  Current Medications:       Current Outpatient Prescriptions:     ibuprofen (MOTRIN) 400 mg tablet, take 1 Tab by mouth every four (4) hours as needed for Pain., Disp: 30 Tab, Rfl: 1     Date Last Reviewed:  8/8/2017     Number of Personal Factors/Comorbidities that affect the Plan of Care: 1-2: MODERATE COMPLEXITY   EXAMINATION:   Observation/Orthostatic Postural Assessment:          Patient denies any LE pain, paresthesia or symptoms. Patient denies any increase of symptoms with cough, sneeze or valsalva. Patient denies any saddle paresthesia or bowel/bladder deficits. Observation/Orthostatic Postural Assessment: Patient exhibits a increased lumbar lordosis and convex right type I curve in mid-thoracic spine. Lower extremity weight bearing is slight increased left. Observation of gait indicates challenges with pelvic mobility  (patient is a pelvic/hip walker versus an efficient trunk walker).  Lower quadrant bony landmarks are right elevated compared to left. Leg swing for innominate mobility indicates bilateral limitations into innominate extension. Vertical compression test (VCT) for alignment is 4-. Elbow flexion test (EFT) for motor activation is 3. Soft tissue observation indicates restrictions noted in bilateral iliopsoas, hamstrings, right greater than left, piriformis right greater than left. Palpation/Tissue, Texture, Tension, Tonal and Length Abnormalities: Myofascial assessment indicates restrictions into thoracolumbar fascia. Muscle length testing in modified Harley position exhibits restricted right greater than left. Hamstring flexibility tested supine with straight leg raise (SLR) is restricted right greater than left. Piriformis length is restricted right greater than left. Quadriceps flexibility tested prone with heel to buttock is moderate restrictions right. Ely test is positive for rectus femoris tightness. Gastrocnemius and soleus flexibility are restricted right. Sacrotuberous ligament is right restrictions. Sacrococcygeal junction exhibits right rotated. Body of coccyx is flexed. ROM: Passive trunk rotation is 90% available to the R and 90% available to the L. Kinetic testing in standing including forward bend test is positive left. Marcher's test is positive left. Pelvic shear test is standing exhibits decreased excursion of bilateral shear with a hard end feel. Single plane active movements through lumbar spine in standing exhibit WFL, tightness into hamstrings, right greater than left. Functional squat for LE clearance is WFL. AROM (PROM) Right Left   Hip flexion/Innominate flexion 90 90   Hip extension/Innominate extension 8 8   Hip external rotation (ER) 25 25   Hip internal rotation (IR) 20 20   Hip abduction 45 45   Strength: Lumbar protective mechanism (LPM) are present for initiation.    LPM Strength */5   R anterior to posterior diagonal 3   L anterior to posterior diagonal 3   R posterior to anterior diagonal 4   L posterior to anterior diagonal 4     Manual Muscle Test (*/5) Right Left   Knee extension 4+ 5   Knee flexion 4+ 5   Hip flexion 4 5   Hip ER 4 5   Hip IR 4 5   Hip extension 4- 4   Hip abduction 4- 4   Hip adduction 5 5   Ankle DF 4- 5   Ankle PF 5 5   Core stability 4/5     Special Tests:  Josefine Gathers test is negative. Scours test is negative  Neurological Screen:  Myotomes: Key muscle strength testing through bilateral LE is intact. Dermatomes: Sensation testing through bilateral lower quadrants for light touch is intact. Reflexes: Patellar (L4) and Achilles (S1) are 2+ and WFL. Neural tension tests: Passive straight leg raise (SLR) test is negative. Slump test is negative. Functional Mobility:  Overall demonstrates good mobility with functional tasks, challenged with balance in single limb stance       Body Structures Involved:  1. Nerves  2. Joints  3. Muscles Body Functions Affected:  1. Sensory/Pain  2. Neuromusculoskeletal  3. Movement Related Activities and Participation Affected:  1. Mobility  2. Community, Social and [place]   Number of elements (examined above) that affect the Plan of Care: 4+: HIGH COMPLEXITY   CLINICAL PRESENTATION:   Presentation: Evolving clinical presentation with changing clinical characteristics: MODERATE COMPLEXITY   CLINICAL DECISION MAKING:   Outcome Measure: Tool Used: Modified Oswestry Low Back Pain Questionnaire  Score:  Initial: 3/50  Most Recent: X/50 (Date: -- )   Interpretation of Score: Each section is scored on a 0-5 scale, 5 representing the greatest disability. The scores of each section are added together for a total score of 50. Medical Necessity:   · Patient is expected to demonstrate progress in strength, range of motion, balance, coordination and functional technique to increase independence with ambulation and balance to prepare for her testing for the [facility]   Reason for Services/Other Comments:  · Patient continues to require skilled intervention due to she would benefit from additional training in endurance of RLE and lumbar spine  and to improve overall mobility, stability and balance. .   Use of outcome tool(s) and clinical judgement create a POC that gives a: Questionable prediction of patient's progress: MODERATE COMPLEXITY            TREATMENT:   (In addition to Assessment/Re-Assessment sessions the following treatments were rendered)  Pre-treatment Symptoms/Complaints:  Patient notes her AFO she wears at night has broken and looking into getting a new AFO. Decreased mobility into right ankle dorsiflexion secondary to limited use of her home AFO. Pain: Initial:   Pain Intensity 1: 3  Pain Location 1: Buttocks  Pain Orientation 1: Right  Post Session:  1/10     Therapeutic Exercise: (10 Minutes):  Exercises per grid below to improve mobility, strength, balance and coordination. Required minimal verbal and manual cues to promote proper body alignment, promote proper body posture, promote proper body mechanics and promote proper body breathing techniques. Progressed resistance, range, repetitions and complexity of movement as indicated. Date:  8/8/2017   Activity/Exercise Parameters   Balance exercises    Single limb stance     Quadriped posterior depression X 10 reps, 10 sec holds ball into wall   Treadmill ambulation    Review HEP X 5 minute review of balance and hip abduction strengthening exercises. Side lying left - right hip extension X 10 reps, 10 sec holds   Side lying for right anterior elevation and posterior depression of pelvis X 5 reps, 10 sec holds each direction. Manual Therapy (    Soft Tissue Mobilization Duration  Duration: 50 Minutes): Manual techniques to facilitate improved motion and decreased pain.   · Supine bilateral iliopsoas release with FMP of lower trunk rotation  · Supine right hamstrings, soft tissue mobilization right greater than left  · Prone sacral mobilizations, p/a with lower trunk rotation  · With written consent received and precautions reviewed, instrument-assisted soft tissue mobilization was performed to right gluteus medius, minimus and piriformis for the purpose of activation with a positive treatment effect and no complications noted. · Side lying left for right prolonged holds into posterior depression and manual resistance into anterior elevation of pelvis, with hip flexion, hip adduction and ER and ankle DF    (Used abbreviations: MET - muscle energy technique; PNF - proprioceptive neuromuscular facilitation; NMR - neuromuscular re-education; a/p - anterior to posterior; p/a - posterior to anterior, FMP - functional movement patterns)        Treatment/Session Assessment:    · Response to Treatment: decreased soft tissue restrictions noted in RLE, improved activation of RLE with tool assisted soft tissue techniques . · Compliance with Program/Exercises: compliant all of the time. · Recommendations/Intent for next treatment session: \"Next visit will focus on advancements to more challenging activities\".   Total Treatment Duration:  PT Patient Time In/Time Out  Time In: 1135  Time Out: 13946 [facility] File, PT

## 2017-08-15 ENCOUNTER — HOSPITAL ENCOUNTER (OUTPATIENT)
Dept: PHYSICAL THERAPY | Age: 37
Discharge: HOME | End: 2017-08-15
Payer: COMMERCIAL

## 2017-08-15 PROCEDURE — 97140 MANUAL THERAPY 1/> REGIONS: CPT

## 2017-08-16 NOTE — PROGRESS NOTES
Kolleen Castleman Herlugson  : [facility] [address]  [phone]   [phone]        OUTPATIENT PHYSICAL THERAPY:Daily Note 8/15/2017    ICD-10: Treatment Diagnosis: low back pain M54.5  ICD-10: Treatment Diagnosis: difficulty walking R26.2  ICD-10: Treatment Diagnosis: stiffness in joint; right foot M25.674  Precautions/Allergies:   Sulfa (sulfonamide antibiotics)   Fall Risk Score: 0 (? 5 = High Risk)  MD Orders: self referred MEDICAL/REFERRING DIAGNOSIS:  Foot pain [M79.673]   DATE OF ONSET: chronic  REFERRING PHYSICIAN: Ady Guaman MD  RETURN PHYSICIAN APPOINTMENT: as needed     RE-ASSESSMENT:  Ms. Brian Barr presents to physical therapy with history of low back pain/right buttock pain and antalgic gait. She presents with limitations in RLE ROM, especially into her foot and ankle, with limited dorsiflexion. She has a history of a cavernous brainstem resulting in challenges with balance and gait, however patient is very motivated and demonstrates great strength through UE and LE. She would benefit from skilled physical therapy to improve her overall mobility, stability, balance and neuromuscular facilitation. PROBLEM LIST (Impacting functional limitations):  1. Decreased Strength  2. Decreased ADL/Functional Activities  3. Decreased Ambulation Ability/Technique  4. Decreased Balance INTERVENTIONS PLANNED:  1. Balance Exercise  2. Home Exercise Program (HEP)  3. Manual Therapy  4. Neuromuscular Re-education/Strengthening  5. Range of Motion (ROM)  6. Therapeutic Activites  7. Therapeutic Exercise/Strengthening   TREATMENT PLAN:  Effective Dates: 17 TO 10/11/17  Frequency/Duration: 1x a week for 12 weeks  GOALS: (Goals have been discussed and agreed upon with patient.)  Discharge Goals: Time Frame: 4 weeks  1.  Patient demonstrates improved balance with ability to stand for 60 seconds single leg without loss of balance. - ONGOING  2. Patient able to to demonstrates improved pelvic mobility with gait, for 300' without verbal cueing or resistance from therapist. - ONGOING  3. Patient demonstrated ability to run 2 miles without onset of discomfort or loss of balance. - ONGOING  4. Patient able to perform balance exercises for 15 minutes with no more than 3 loss of balances. Rehabilitation Potential For Stated Goals: Excellent  Regarding Ambrosio Forte's therapy, I certify that the treatment plan above will be carried out by a therapist or under their direction. Thank you for this referral,  Darell Scott, PT     Referring Physician Signature: Radha Ronquillo MD              Date                    The information in this section was collected on 7/19/17 (except where otherwise noted). HISTORY:   History of Present Injury/Illness (Reason for Referral):  Patient has history of a cavernous brainstem, history of stroke affecting her right side. She has a chronic history of intermittent low back pain and challenges with mobility into right foot/ankle. She is very motivated and performs daily exercises and strength training to improve her overall mobility and function. She has decided she wants to try out to join the [employer] and to date has passed 99% of her testing. :Unfortunately she was denied by the the [employer] reserves, however she continues to want to improve her overall strength and balance. She has also expressed interest in obtaining a new AFO. Patient denies dizziness, drop attacks, numbness/tingling, bowel/bladder dysfunction and/or unexplained weight gain/loss. Past Medical History/Comorbidities:   Ms. Malissa Wynne  has a past medical history of Stroke [facility]). She also has no past medical history of Abnormal glandular Papanicolaou smear of cervix; Acquired hypothyroidism; Adult physical abuse; Anemia NEC;  Asthma; Chronic kidney disease, unspecified; Diabetes mellitus; Essential hypertension; Female infertility of unspecified origin; Heart abnormalities; Herpes simplex without mention of complication; Human immunodeficiency virus (HIV) disease ([facility] [address] OTHER MEDICAL; Liver disease; Phlebitis and thrombophlebitis of unspecified site; Postpartum depression; Psychiatric problem; Rhesus isoimmunization unspecified as to episode of care in pregnancy; Shock due to anesthesia not elsewhere classified; Sickle-cell disease, unspecified ([facility] [address] Systemic lupus erythematosus ([facility] [address] Unspecified breast disorder; Unspecified diseases of blood and blood-forming organs; or Unspecified epilepsy without mention of intractable epilepsy ([facility] [address] Ms. Brian Barr  has a past surgical history that includes neurological procedure unlisted. Social History/Living Environment:     Lives in a private home with her  and two children  Prior Level of Function/Work/Activity:  Patient is very active with daily workouts and running. She works full time with "[employer] Dominant Side:         BILATERAL  Current Medications:       Current Outpatient Prescriptions:     ibuprofen (MOTRIN) 400 mg tablet, take 1 Tab by mouth every four (4) hours as needed for Pain., Disp: 30 Tab, Rfl: 1     Date Last Reviewed:  8/15/2017     Number of Personal Factors/Comorbidities that affect the Plan of Care: 1-2: MODERATE COMPLEXITY   EXAMINATION:   Observation/Orthostatic Postural Assessment:          Patient denies any LE pain, paresthesia or symptoms. Patient denies any increase of symptoms with cough, sneeze or valsalva. Patient denies any saddle paresthesia or bowel/bladder deficits. Observation/Orthostatic Postural Assessment: Patient exhibits a increased lumbar lordosis and convex right type I curve in mid-thoracic spine. Lower extremity weight bearing is slight increased left. Observation of gait indicates challenges with pelvic mobility  (patient is a pelvic/hip walker versus an efficient trunk walker).  Lower quadrant bony landmarks are right elevated compared to left. Leg swing for innominate mobility indicates bilateral limitations into innominate extension. Vertical compression test (VCT) for alignment is 4-. Elbow flexion test (EFT) for motor activation is 3. Soft tissue observation indicates restrictions noted in bilateral iliopsoas, hamstrings, right greater than left, piriformis right greater than left. Palpation/Tissue, Texture, Tension, Tonal and Length Abnormalities: Myofascial assessment indicates restrictions into thoracolumbar fascia. Muscle length testing in modified Harley position exhibits restricted right greater than left. Hamstring flexibility tested supine with straight leg raise (SLR) is restricted right greater than left. Piriformis length is restricted right greater than left. Quadriceps flexibility tested prone with heel to buttock is moderate restrictions right. Ely test is positive for rectus femoris tightness. Gastrocnemius and soleus flexibility are restricted right. Sacrotuberous ligament is right restrictions. Sacrococcygeal junction exhibits right rotated. Body of coccyx is flexed. ROM: Passive trunk rotation is 90% available to the R and 90% available to the L. Kinetic testing in standing including forward bend test is positive left. Marcher's test is positive left. Pelvic shear test is standing exhibits decreased excursion of bilateral shear with a hard end feel. Single plane active movements through lumbar spine in standing exhibit WFL, tightness into hamstrings, right greater than left. Functional squat for LE clearance is WFL. AROM (PROM) Right Left   Hip flexion/Innominate flexion 90 90   Hip extension/Innominate extension 8 8   Hip external rotation (ER) 25 25   Hip internal rotation (IR) 20 20   Hip abduction 45 45   Strength: Lumbar protective mechanism (LPM) are present for initiation.    LPM Strength */5   R anterior to posterior diagonal 3   L anterior to posterior diagonal 3   R posterior to anterior diagonal 4   L posterior to anterior diagonal 4     Manual Muscle Test (*/5) Right Left   Knee extension 4+ 5   Knee flexion 4+ 5   Hip flexion 4 5   Hip ER 4 5   Hip IR 4 5   Hip extension 4- 4   Hip abduction 4- 4   Hip adduction 5 5   Ankle DF 4- 5   Ankle PF 5 5   Core stability 4/5     Special Tests:  Authur Tiffanie test is negative. Scours test is negative  Neurological Screen:  Myotomes: Key muscle strength testing through bilateral LE is intact. Dermatomes: Sensation testing through bilateral lower quadrants for light touch is intact. Reflexes: Patellar (L4) and Achilles (S1) are 2+ and WFL. Neural tension tests: Passive straight leg raise (SLR) test is negative. Slump test is negative. Functional Mobility:  Overall demonstrates good mobility with functional tasks, challenged with balance in single limb stance       Body Structures Involved:  1. Nerves  2. Joints  3. Muscles Body Functions Affected:  1. Sensory/Pain  2. Neuromusculoskeletal  3. Movement Related Activities and Participation Affected:  1. Mobility  2. Community, Social and [place]   Number of elements (examined above) that affect the Plan of Care: 4+: HIGH COMPLEXITY   CLINICAL PRESENTATION:   Presentation: Evolving clinical presentation with changing clinical characteristics: MODERATE COMPLEXITY   CLINICAL DECISION MAKING:   Outcome Measure: Tool Used: Modified Oswestry Low Back Pain Questionnaire  Score:  Initial: 3/50  Most Recent: X/50 (Date: -- )   Interpretation of Score: Each section is scored on a 0-5 scale, 5 representing the greatest disability. The scores of each section are added together for a total score of 50. Medical Necessity:   · Patient is expected to demonstrate progress in strength, range of motion, balance, coordination and functional technique to increase independence with ambulation and balance to prepare for her testing for the [facility]   Reason for Services/Other Comments:  · Patient continues to require skilled intervention due to she would benefit from additional training in endurance of RLE and lumbar spine  and to improve overall mobility, stability and balance. .   Use of outcome tool(s) and clinical judgement create a POC that gives a: Questionable prediction of patient's progress: MODERATE COMPLEXITY            TREATMENT:   (In addition to Assessment/Re-Assessment sessions the following treatments were rendered)  Pre-treatment Symptoms/Complaints:  Patient notes decreased pain and improved gait for 3 days after last PT session with improved control of her RLE. Pain: Initial:   Pain Intensity 1: 2  Pain Location 1: Spine, lumbar  Pain Orientation 1: Right  Post Session:  1/10     Therapeutic Exercise: (5 Minutes):  Exercises per grid below to improve mobility, strength, balance and coordination. Required minimal verbal and manual cues to promote proper body alignment, promote proper body posture, promote proper body mechanics and promote proper body breathing techniques. Progressed resistance, range, repetitions and complexity of movement as indicated. Date:  8/15/2017   Activity/Exercise Parameters   Balance exercises    Single limb stance     Quadriped posterior depression    Treadmill ambulation    Review HEP X 5 minute review and review obtaining an AFO   Side lying left - right hip extension    Side lying for right anterior elevation and posterior depression of pelvis      Manual Therapy (    Soft Tissue Mobilization Duration  Duration: 40 Minutes): Manual techniques to facilitate improved motion and decreased pain.   · Supine bilateral iliopsoas release with FMP of lower trunk rotation  · Supine right hamstrings, soft tissue mobilization right greater than left  · Prone sacral mobilizations, p/a with lower trunk rotation  · With written consent received and precautions reviewed, instrument-assisted soft tissue mobilization was performed to right gluteus medius, minimus and piriformis for the purpose of activation with a positive treatment effect and no complications noted. · Side lying left for right prolonged holds into posterior depression and manual resistance into anterior elevation of pelvis, with hip flexion, hip adduction and ER and ankle DF    (Used abbreviations: MET - muscle energy technique; PNF - proprioceptive neuromuscular facilitation; NMR - neuromuscular re-education; a/p - anterior to posterior; p/a - posterior to anterior, FMP - functional movement patterns)        Treatment/Session Assessment:    · Response to Treatment: increased LE activation of ankle DF and PF with tool assisted soft tissue techniques, improved RLE ROM at end of session. · Compliance with Program/Exercises: compliant all of the time. · Recommendations/Intent for next treatment session: \"Next visit will focus on advancements to more challenging activities\".   Total Treatment Duration:  PT Patient Time In/Time Out  Time In: 0730  Time Out: [address] Shekhar Valdez PT

## 2017-08-21 ENCOUNTER — APPOINTMENT (OUTPATIENT)
Dept: PHYSICAL THERAPY | Age: 37
End: 2017-08-21
Payer: COMMERCIAL

## 2017-08-21 ENCOUNTER — HOSPITAL ENCOUNTER (OUTPATIENT)
Dept: PHYSICAL THERAPY | Age: 37
Discharge: HOME | End: 2017-08-21
Payer: COMMERCIAL

## 2017-08-21 PROCEDURE — 97110 THERAPEUTIC EXERCISES: CPT

## 2017-08-21 PROCEDURE — 97140 MANUAL THERAPY 1/> REGIONS: CPT

## 2017-08-21 NOTE — PROGRESS NOTES
Renita Forte  : 1980 [facility] [address]  [phone]   [phone]        OUTPATIENT PHYSICAL THERAPY:Daily Note 2017    ICD-10: Treatment Diagnosis: low back pain M54.5  ICD-10: Treatment Diagnosis: difficulty walking R26.2  ICD-10: Treatment Diagnosis: stiffness in joint; right foot M25.674  Precautions/Allergies:   Sulfa (sulfonamide antibiotics)   Fall Risk Score: 0 (? 5 = High Risk)  MD Orders: self referred MEDICAL/REFERRING DIAGNOSIS:  Foot pain [M79.673]   DATE OF ONSET: chronic  REFERRING PHYSICIAN: Madisyn De La Fuente MD  RETURN PHYSICIAN APPOINTMENT: as needed     RE-ASSESSMENT:  Ms. Lieutenant Moffett presents to physical therapy with history of low back pain/right buttock pain and antalgic gait. She presents with limitations in RLE ROM, especially into her foot and ankle, with limited dorsiflexion. She has a history of a cavernous brainstem resulting in challenges with balance and gait, however patient is very motivated and demonstrates great strength through UE and LE. She would benefit from skilled physical therapy to improve her overall mobility, stability, balance and neuromuscular facilitation. PROBLEM LIST (Impacting functional limitations):  1. Decreased Strength  2. Decreased ADL/Functional Activities  3. Decreased Ambulation Ability/Technique  4. Decreased Balance INTERVENTIONS PLANNED:  1. Balance Exercise  2. Home Exercise Program (HEP)  3. Manual Therapy  4. Neuromuscular Re-education/Strengthening  5. Range of Motion (ROM)  6. Therapeutic Activites  7. Therapeutic Exercise/Strengthening   TREATMENT PLAN:  Effective Dates: 17 TO 10/11/17  Frequency/Duration: 1x a week for 12 weeks  GOALS: (Goals have been discussed and agreed upon with patient.)  Discharge Goals: Time Frame: 4 weeks  1.  Patient demonstrates improved balance with ability to stand for 60 seconds single leg without loss of balance. - ONGOING  2. Patient able to to demonstrates improved pelvic mobility with gait, for 300' without verbal cueing or resistance from therapist. - ONGOING  3. Patient demonstrated ability to run 2 miles without onset of discomfort or loss of balance. - ONGOING  4. Patient able to perform balance exercises for 15 minutes with no more than 3 loss of balances. Rehabilitation Potential For Stated Goals: Excellent  Regarding Ashwin Forte's therapy, I certify that the treatment plan above will be carried out by a therapist or under their direction. Thank you for this referral,  Efraín Gonzales PT     Referring Physician Signature: Bianca Moura MD              Date                    The information in this section was collected on 7/19/17 (except where otherwise noted). HISTORY:   History of Present Injury/Illness (Reason for Referral):  Patient has history of a cavernous brainstem, history of stroke affecting her right side. She has a chronic history of intermittent low back pain and challenges with mobility into right foot/ankle. She is very motivated and performs daily exercises and strength training to improve her overall mobility and function. She has decided she wants to try out to join the gumi and to date has passed 99% of her testing. :Unfortunately she was denied by the the [employer] reserves, however she continues to want to improve her overall strength and balance. She has also expressed interest in obtaining a new AFO. Patient denies dizziness, drop attacks, numbness/tingling, bowel/bladder dysfunction and/or unexplained weight gain/loss. Past Medical History/Comorbidities:   Ms. Richmond Moreno  has a past medical history of Stroke [facility]). She also has no past medical history of Abnormal glandular Papanicolaou smear of cervix; Acquired hypothyroidism; Adult physical abuse; Anemia NEC;  Asthma; Chronic kidney disease, unspecified; Diabetes mellitus; Essential hypertension; Female infertility of unspecified origin; Heart abnormalities; Herpes simplex without mention of complication; Human immunodeficiency virus (HIV) disease ([facility] [address] OTHER MEDICAL; Liver disease; Phlebitis and thrombophlebitis of unspecified site; Postpartum depression; Psychiatric problem; Rhesus isoimmunization unspecified as to episode of care in pregnancy; Shock due to anesthesia not elsewhere classified; Sickle-cell disease, unspecified ([facility] [address] Systemic lupus erythematosus ([facility] [address] Unspecified breast disorder; Unspecified diseases of blood and blood-forming organs; or Unspecified epilepsy without mention of intractable epilepsy ([facility] [address] Ms. Laura Brewer  has a past surgical history that includes neurological procedure unlisted. Social History/Living Environment:     Lives in a private home with her  and two children  Prior Level of Function/Work/Activity:  Patient is very active with daily workouts and running. She works full time with [employer]. Dominant Side:         BILATERAL  Current Medications:       Current Outpatient Prescriptions:     ibuprofen (MOTRIN) 400 mg tablet, take 1 Tab by mouth every four (4) hours as needed for Pain., Disp: 30 Tab, Rfl: 1     Date Last Reviewed:  8/21/2017     Number of Personal Factors/Comorbidities that affect the Plan of Care: 1-2: MODERATE COMPLEXITY   EXAMINATION:   Observation/Orthostatic Postural Assessment:          Patient denies any LE pain, paresthesia or symptoms. Patient denies any increase of symptoms with cough, sneeze or valsalva. Patient denies any saddle paresthesia or bowel/bladder deficits. Observation/Orthostatic Postural Assessment: Patient exhibits a increased lumbar lordosis and convex right type I curve in mid-thoracic spine. Lower extremity weight bearing is slight increased left. Observation of gait indicates challenges with pelvic mobility  (patient is a pelvic/hip walker versus an efficient trunk walker).  Lower quadrant bony landmarks are right elevated compared to left. Leg swing for innominate mobility indicates bilateral limitations into innominate extension. Vertical compression test (VCT) for alignment is 4-. Elbow flexion test (EFT) for motor activation is 3. Soft tissue observation indicates restrictions noted in bilateral iliopsoas, hamstrings, right greater than left, piriformis right greater than left. Palpation/Tissue, Texture, Tension, Tonal and Length Abnormalities: Myofascial assessment indicates restrictions into thoracolumbar fascia. Muscle length testing in modified Harley position exhibits restricted right greater than left. Hamstring flexibility tested supine with straight leg raise (SLR) is restricted right greater than left. Piriformis length is restricted right greater than left. Quadriceps flexibility tested prone with heel to buttock is moderate restrictions right. Ely test is positive for rectus femoris tightness. Gastrocnemius and soleus flexibility are restricted right. Sacrotuberous ligament is right restrictions. Sacrococcygeal junction exhibits right rotated. Body of coccyx is flexed. ROM: Passive trunk rotation is 90% available to the R and 90% available to the L. Kinetic testing in standing including forward bend test is positive left. Marcher's test is positive left. Pelvic shear test is standing exhibits decreased excursion of bilateral shear with a hard end feel. Single plane active movements through lumbar spine in standing exhibit WFL, tightness into hamstrings, right greater than left. Functional squat for LE clearance is WFL. AROM (PROM) Right Left   Hip flexion/Innominate flexion 90 90   Hip extension/Innominate extension 8 8   Hip external rotation (ER) 25 25   Hip internal rotation (IR) 20 20   Hip abduction 45 45   Strength: Lumbar protective mechanism (LPM) are present for initiation.    LPM Strength */5   R anterior to posterior diagonal 3   L anterior to posterior diagonal 3   R posterior to anterior diagonal 4   L posterior to anterior diagonal 4     Manual Muscle Test (*/5) Right Left   Knee extension 4+ 5   Knee flexion 4+ 5   Hip flexion 4 5   Hip ER 4 5   Hip IR 4 5   Hip extension 4- 4   Hip abduction 4- 4   Hip adduction 5 5   Ankle DF 4- 5   Ankle PF 5 5   Core stability 4/5     Special Tests:  Norris Connie test is negative. Scours test is negative  Neurological Screen:  Myotomes: Key muscle strength testing through bilateral LE is intact. Dermatomes: Sensation testing through bilateral lower quadrants for light touch is intact. Reflexes: Patellar (L4) and Achilles (S1) are 2+ and WFL. Neural tension tests: Passive straight leg raise (SLR) test is negative. Slump test is negative. Functional Mobility:  Overall demonstrates good mobility with functional tasks, challenged with balance in single limb stance       Body Structures Involved:  1. Nerves  2. Joints  3. Muscles Body Functions Affected:  1. Sensory/Pain  2. Neuromusculoskeletal  3. Movement Related Activities and Participation Affected:  1. Mobility  2. Community, Social and [place]   Number of elements (examined above) that affect the Plan of Care: 4+: HIGH COMPLEXITY   CLINICAL PRESENTATION:   Presentation: Evolving clinical presentation with changing clinical characteristics: MODERATE COMPLEXITY   CLINICAL DECISION MAKING:   Outcome Measure: Tool Used: Modified Oswestry Low Back Pain Questionnaire  Score:  Initial: 3/50  Most Recent: X/50 (Date: -- )   Interpretation of Score: Each section is scored on a 0-5 scale, 5 representing the greatest disability. The scores of each section are added together for a total score of 50. Medical Necessity:   · Patient is expected to demonstrate progress in strength, range of motion, balance, coordination and functional technique to increase independence with ambulation and balance to prepare for her testing for the [facility]   Reason for Services/Other Comments:  · Patient continues to require skilled intervention due to she would benefit from additional training in endurance of RLE and lumbar spine  and to improve overall mobility, stability and balance. .   Use of outcome tool(s) and clinical judgement create a POC that gives a: Questionable prediction of patient's progress: MODERATE COMPLEXITY            TREATMENT:   (In addition to Assessment/Re-Assessment sessions the following treatments were rendered)  Pre-treatment Symptoms/Complaints:  Patient notes attempted to dead lift this week and noted some right low back pain towards end of sets. Pain: Initial:   Pain Intensity 1: 3  Pain Location 1: Spine, lumbar  Pain Orientation 1: Right  Post Session:  1/10     Therapeutic Exercise: (10 Minutes):  Exercises per grid below to improve mobility, strength, balance and coordination. Required minimal verbal and manual cues to promote proper body alignment, promote proper body posture, promote proper body mechanics and promote proper body breathing techniques. Progressed resistance, range, repetitions and complexity of movement as indicated. Date:  8/21/2017   Activity/Exercise Parameters   Balance exercises    Single limb stance     Quadriped posterior depression X 10 reps, 10 sec holds   Treadmill ambulation    Review HEP X 5 minute review of HEP, self stretches, PNF techniques, lifting techniques   Side lying left - right hip extension    Side lying for right anterior elevation and posterior depression of pelvis X 10 reps, 10 sec holds     Manual Therapy (    Soft Tissue Mobilization Duration  Duration: 35 Minutes): Manual techniques to facilitate improved motion and decreased pain.   · Supine bilateral iliopsoas release with FMP of lower trunk rotation  · Supine right hamstrings, soft tissue mobilization right greater than left  · Prone sacral mobilizations, p/a with lower trunk rotation  · With written consent received and precautions reviewed, instrument-assisted soft tissue mobilization was performed to right gluteus medius, minimus and piriformis for the purpose of activation with a positive treatment effect and no complications noted. · Side lying left for right prolonged holds into posterior depression and manual resistance into anterior elevation of pelvis, with hip flexion, hip adduction and ER and ankle DF    (Used abbreviations: MET - muscle energy technique; PNF - proprioceptive neuromuscular facilitation; NMR - neuromuscular re-education; a/p - anterior to posterior; p/a - posterior to anterior, FMP - functional movement patterns)        Treatment/Session Assessment:    · Response to Treatment: improved stability through pelvis with PNF LE patterns at end of session, improved LE ankle DF with tool assisted techniques. · Compliance with Program/Exercises: compliant all of the time. · Recommendations/Intent for next treatment session: \"Next visit will focus on advancements to more challenging activities\".   Total Treatment Duration:  PT Patient Time In/Time Out  Time In: 6665  Time Out: [address] [facility] [place], PT

## 2017-08-22 ENCOUNTER — APPOINTMENT (OUTPATIENT)
Dept: PHYSICAL THERAPY | Age: 37
End: 2017-08-22
Payer: COMMERCIAL

## 2017-08-29 ENCOUNTER — HOSPITAL ENCOUNTER (OUTPATIENT)
Dept: PHYSICAL THERAPY | Age: 37
Discharge: HOME | End: 2017-08-29
Payer: COMMERCIAL

## 2017-08-29 PROCEDURE — 97140 MANUAL THERAPY 1/> REGIONS: CPT

## 2017-08-29 NOTE — PROGRESS NOTES
Kolleen Castleman Herlugson  : [facility] [address]  [phone]   [phone]        OUTPATIENT PHYSICAL THERAPY:Daily Note 2017    ICD-10: Treatment Diagnosis: low back pain M54.5  ICD-10: Treatment Diagnosis: difficulty walking R26.2  ICD-10: Treatment Diagnosis: stiffness in joint; right foot M25.674  Precautions/Allergies:   Sulfa (sulfonamide antibiotics)   Fall Risk Score: 0 (? 5 = High Risk)  MD Orders: self referred MEDICAL/REFERRING DIAGNOSIS:  Foot pain [M79.673]   DATE OF ONSET: chronic  REFERRING PHYSICIAN: Ady Guaman MD  RETURN PHYSICIAN APPOINTMENT: as needed     RE-ASSESSMENT:  Ms. Brian Barr presents to physical therapy with history of low back pain/right buttock pain and antalgic gait. She presents with limitations in RLE ROM, especially into her foot and ankle, with limited dorsiflexion. She has a history of a cavernous brainstem resulting in challenges with balance and gait, however patient is very motivated and demonstrates great strength through UE and LE. She would benefit from skilled physical therapy to improve her overall mobility, stability, balance and neuromuscular facilitation. PROBLEM LIST (Impacting functional limitations):  1. Decreased Strength  2. Decreased ADL/Functional Activities  3. Decreased Ambulation Ability/Technique  4. Decreased Balance INTERVENTIONS PLANNED:  1. Balance Exercise  2. Home Exercise Program (HEP)  3. Manual Therapy  4. Neuromuscular Re-education/Strengthening  5. Range of Motion (ROM)  6. Therapeutic Activites  7. Therapeutic Exercise/Strengthening   TREATMENT PLAN:  Effective Dates: 17 TO 10/11/17  Frequency/Duration: 1x a week for 12 weeks  GOALS: (Goals have been discussed and agreed upon with patient.)  Discharge Goals: Time Frame: 4 weeks  1.  Patient demonstrates improved balance with ability to stand for 60 seconds single leg without loss of balance. - ONGOING  2. Patient able to to demonstrates improved pelvic mobility with gait, for 300' without verbal cueing or resistance from therapist. - ONGOING  3. Patient demonstrated ability to run 2 miles without onset of discomfort or loss of balance. - ONGOING  4. Patient able to perform balance exercises for 15 minutes with no more than 3 loss of balances. Rehabilitation Potential For Stated Goals: Excellent  Regarding Chelo Forte's therapy, I certify that the treatment plan above will be carried out by a therapist or under their direction. Thank you for this referral,  Sloane Davey PT     Referring Physician Signature: Tabatha Yanes MD              Date                    The information in this section was collected on 7/19/17 (except where otherwise noted). HISTORY:   History of Present Injury/Illness (Reason for Referral):  Patient has history of a cavernous brainstem, history of stroke affecting her right side. She has a chronic history of intermittent low back pain and challenges with mobility into right foot/ankle. She is very motivated and performs daily exercises and strength training to improve her overall mobility and function. She has decided she wants to try out to join the [employer] and to date has passed 99% of her testing. :Unfortunately she was denied by the the [employer] reserves, however she continues to want to improve her overall strength and balance. She has also expressed interest in obtaining a new AFO. Patient denies dizziness, drop attacks, numbness/tingling, bowel/bladder dysfunction and/or unexplained weight gain/loss. Past Medical History/Comorbidities:   Ms. Wallace Churchill  has a past medical history of Stroke [facility]). She also has no past medical history of Abnormal glandular Papanicolaou smear of cervix; Acquired hypothyroidism; Adult physical abuse; Anemia NEC;  Asthma; Chronic kidney disease, unspecified; Diabetes mellitus; Essential hypertension; Female infertility of unspecified origin; Heart abnormalities; Herpes simplex without mention of complication; Human immunodeficiency virus (HIV) disease ([facility] OTHER MEDICAL; Liver disease; Phlebitis and thrombophlebitis of unspecified site; Postpartum depression; Psychiatric problem; Rhesus isoimmunization unspecified as to episode of care in pregnancy; Shock due to anesthesia not elsewhere classified; Sickle-cell disease, unspecified ([facility] Systemic lupus erythematosus ([facility] Unspecified breast disorder; Unspecified diseases of blood and blood-forming organs; or Unspecified epilepsy without mention of intractable epilepsy ([facility] Ms. Kevin Reid  has a past surgical history that includes neurological procedure unlisted. Social History/Living Environment:     Lives in a private home with her  and two children  Prior Level of Function/Work/Activity:  Patient is very active with daily workouts and running. She works full time with [employer]. Dominant Side:         BILATERAL  Current Medications:       Current Outpatient Prescriptions:     ibuprofen (MOTRIN) 400 mg tablet, take 1 Tab by mouth every four (4) hours as needed for Pain., Disp: 30 Tab, Rfl: 1     Date Last Reviewed:  8/29/2017     Number of Personal Factors/Comorbidities that affect the Plan of Care: 1-2: MODERATE COMPLEXITY   EXAMINATION:   Observation/Orthostatic Postural Assessment:          Patient denies any LE pain, paresthesia or symptoms. Patient denies any increase of symptoms with cough, sneeze or valsalva. Patient denies any saddle paresthesia or bowel/bladder deficits. Observation/Orthostatic Postural Assessment: Patient exhibits a increased lumbar lordosis and convex right type I curve in mid-thoracic spine. Lower extremity weight bearing is slight increased left. Observation of gait indicates challenges with pelvic mobility  (patient is a pelvic/hip walker versus an efficient trunk walker).  Lower quadrant bony landmarks are right elevated compared to left. Leg swing for innominate mobility indicates bilateral limitations into innominate extension. Vertical compression test (VCT) for alignment is 4-. Elbow flexion test (EFT) for motor activation is 3. Soft tissue observation indicates restrictions noted in bilateral iliopsoas, hamstrings, right greater than left, piriformis right greater than left. Palpation/Tissue, Texture, Tension, Tonal and Length Abnormalities: Myofascial assessment indicates restrictions into thoracolumbar fascia. Muscle length testing in modified Harley position exhibits restricted right greater than left. Hamstring flexibility tested supine with straight leg raise (SLR) is restricted right greater than left. Piriformis length is restricted right greater than left. Quadriceps flexibility tested prone with heel to buttock is moderate restrictions right. Ely test is positive for rectus femoris tightness. Gastrocnemius and soleus flexibility are restricted right. Sacrotuberous ligament is right restrictions. Sacrococcygeal junction exhibits right rotated. Body of coccyx is flexed. ROM: Passive trunk rotation is 90% available to the R and 90% available to the L. Kinetic testing in standing including forward bend test is positive left. Marcher's test is positive left. Pelvic shear test is standing exhibits decreased excursion of bilateral shear with a hard end feel. Single plane active movements through lumbar spine in standing exhibit WFL, tightness into hamstrings, right greater than left. Functional squat for LE clearance is WFL. AROM (PROM) Right Left   Hip flexion/Innominate flexion 90 90   Hip extension/Innominate extension 8 8   Hip external rotation (ER) 25 25   Hip internal rotation (IR) 20 20   Hip abduction 45 45   Strength: Lumbar protective mechanism (LPM) are present for initiation.    LPM Strength */5   R anterior to posterior diagonal 3   L anterior to posterior diagonal 3   R posterior to anterior diagonal 4   L posterior to anterior diagonal 4     Manual Muscle Test (*/5) Right Left   Knee extension 4+ 5   Knee flexion 4+ 5   Hip flexion 4 5   Hip ER 4 5   Hip IR 4 5   Hip extension 4- 4   Hip abduction 4- 4   Hip adduction 5 5   Ankle DF 4- 5   Ankle PF 5 5   Core stability 4/5     Special Tests:  Jacqualine Shark test is negative. Scours test is negative  Neurological Screen:  Myotomes: Key muscle strength testing through bilateral LE is intact. Dermatomes: Sensation testing through bilateral lower quadrants for light touch is intact. Reflexes: Patellar (L4) and Achilles (S1) are 2+ and WFL. Neural tension tests: Passive straight leg raise (SLR) test is negative. Slump test is negative. Functional Mobility:  Overall demonstrates good mobility with functional tasks, challenged with balance in single limb stance       Body Structures Involved:  1. Nerves  2. Joints  3. Muscles Body Functions Affected:  1. Sensory/Pain  2. Neuromusculoskeletal  3. Movement Related Activities and Participation Affected:  1. Mobility  2. Community, Social and [place]   Number of elements (examined above) that affect the Plan of Care: 4+: HIGH COMPLEXITY   CLINICAL PRESENTATION:   Presentation: Evolving clinical presentation with changing clinical characteristics: MODERATE COMPLEXITY   CLINICAL DECISION MAKING:   Outcome Measure: Tool Used: Modified Oswestry Low Back Pain Questionnaire  Score:  Initial: 3/50  Most Recent: X/50 (Date: -- )   Interpretation of Score: Each section is scored on a 0-5 scale, 5 representing the greatest disability. The scores of each section are added together for a total score of 50. Medical Necessity:   · Patient is expected to demonstrate progress in strength, range of motion, balance, coordination and functional technique to increase independence with ambulation and balance to prepare for her testing for the [facility]   Reason for Services/Other Comments:  · Patient continues to require skilled intervention due to she would benefit from additional training in endurance of RLE and lumbar spine  and to improve overall mobility, stability and balance. .   Use of outcome tool(s) and clinical judgement create a POC that gives a: Questionable prediction of patient's progress: MODERATE COMPLEXITY            TREATMENT:   (In addition to Assessment/Re-Assessment sessions the following treatments were rendered)  Pre-treatment Symptoms/Complaints:  Patient notes able to run for longer duration after last PT session, her night AFO has completely broken in the straps and looking into a new AFO. Pain: Initial:   Pain Intensity 1: 3  Pain Location 1: Spine, lumbar  Pain Orientation 1: Right  Post Session:  1/10     Therapeutic Exercise: (5 Minutes):  Exercises per grid below to improve mobility, strength, balance and coordination. Required minimal verbal and manual cues to promote proper body alignment, promote proper body posture, promote proper body mechanics and promote proper body breathing techniques. Progressed resistance, range, repetitions and complexity of movement as indicated. Date:  8/29/2017   Activity/Exercise Parameters   Balance exercises    Single limb stance     Quadriped posterior depression    Treadmill ambulation    Review HEP X 5 minute review of HEP, self stretches, PNF techniques, lifting techniques   Side lying left - right hip extension    Side lying for right anterior elevation and posterior depression of pelvis      Manual Therapy (    Soft Tissue Mobilization Duration  Duration: 25 Minutes): Manual techniques to facilitate improved motion and decreased pain.   · Supine bilateral iliopsoas release with FMP of lower trunk rotation  · Supine right hamstrings, soft tissue mobilization right greater than left  · Prone sacral mobilizations, p/a with lower trunk rotation  · With written consent received and precautions reviewed, instrument-assisted soft tissue mobilization was performed to right gluteus medius, minimus and piriformis for the purpose of activation with a positive treatment effect and no complications noted. · Side lying left for right prolonged holds into posterior depression and manual resistance into anterior elevation of pelvis, with hip flexion, hip adduction and ER and ankle DF    (Used abbreviations: MET - muscle energy technique; PNF - proprioceptive neuromuscular facilitation; NMR - neuromuscular re-education; a/p - anterior to posterior; p/a - posterior to anterior, FMP - functional movement patterns)        Treatment/Session Assessment:    · Response to Treatment: improved right LE mobility at end of session and improved stability and activation through core stability and pelvis. · Compliance with Program/Exercises: compliant all of the time. · Recommendations/Intent for next treatment session: \"Next visit will focus on advancements to more challenging activities\".   Total Treatment Duration:  PT Patient Time In/Time Out  Time In: 0800  Time Out: 0830    Alysia Dupont, PT

## 2017-09-05 ENCOUNTER — HOSPITAL ENCOUNTER (OUTPATIENT)
Dept: PHYSICAL THERAPY | Age: 37
Discharge: HOME | End: 2017-09-05
Payer: COMMERCIAL

## 2017-09-05 PROCEDURE — 97110 THERAPEUTIC EXERCISES: CPT

## 2017-09-05 PROCEDURE — 97140 MANUAL THERAPY 1/> REGIONS: CPT

## 2017-09-05 NOTE — PROGRESS NOTES
Chente Forte  : 1980 [facility] [address]  [phone]   [phone]        OUTPATIENT PHYSICAL THERAPY:Daily Note 2017    ICD-10: Treatment Diagnosis: low back pain M54.5  ICD-10: Treatment Diagnosis: difficulty walking R26.2  ICD-10: Treatment Diagnosis: stiffness in joint; right foot M25.674  Precautions/Allergies:   Sulfa (sulfonamide antibiotics)   Fall Risk Score: 0 (? 5 = High Risk)  MD Orders: self referred MEDICAL/REFERRING DIAGNOSIS:  Foot pain [M79.673]   DATE OF ONSET: chronic  REFERRING PHYSICIAN: Magdalena Blanco MD  RETURN PHYSICIAN APPOINTMENT: as needed     RE-ASSESSMENT:  Ms. Jerrell Baxter presents to physical therapy with history of low back pain/right buttock pain and antalgic gait. She presents with limitations in RLE ROM, especially into her foot and ankle, with limited dorsiflexion. She has a history of a cavernous brainstem resulting in challenges with balance and gait, however patient is very motivated and demonstrates great strength through UE and LE. She would benefit from skilled physical therapy to improve her overall mobility, stability, balance and neuromuscular facilitation. PROBLEM LIST (Impacting functional limitations):  1. Decreased Strength  2. Decreased ADL/Functional Activities  3. Decreased Ambulation Ability/Technique  4. Decreased Balance INTERVENTIONS PLANNED:  1. Balance Exercise  2. Home Exercise Program (HEP)  3. Manual Therapy  4. Neuromuscular Re-education/Strengthening  5. Range of Motion (ROM)  6. Therapeutic Activites  7. Therapeutic Exercise/Strengthening   TREATMENT PLAN:  Effective Dates: 17 TO 10/11/17  Frequency/Duration: 1x a week for 12 weeks  GOALS: (Goals have been discussed and agreed upon with patient.)  Discharge Goals: Time Frame: 4 weeks  1.  Patient demonstrates improved balance with ability to stand for 60 seconds single leg without loss of balance. - ONGOING  2. Patient able to to demonstrates improved pelvic mobility with gait, for 300' without verbal cueing or resistance from therapist. - ONGOING  3. Patient demonstrated ability to run 2 miles without onset of discomfort or loss of balance. - ONGOING  4. Patient able to perform balance exercises for 15 minutes with no more than 3 loss of balances. Rehabilitation Potential For Stated Goals: Excellent  Regarding Nicole Forte's therapy, I certify that the treatment plan above will be carried out by a therapist or under their direction. Thank you for this referral,  Jose Saez PT     Referring Physician Signature: Irene Colvin MD              Date                    The information in this section was collected on 7/19/17 (except where otherwise noted). HISTORY:   History of Present Injury/Illness (Reason for Referral):  Patient has history of a cavernous brainstem, history of stroke affecting her right side. She has a chronic history of intermittent low back pain and challenges with mobility into right foot/ankle. She is very motivated and performs daily exercises and strength training to improve her overall mobility and function. She has decided she wants to try out to join the [employer] and to date has passed 99% of her testing. :Unfortunately she was denied by the the [address] [place] [address], however she continues to want to improve her overall strength and balance. She has also expressed interest in obtaining a new AFO. Patient denies dizziness, drop attacks, numbness/tingling, bowel/bladder dysfunction and/or unexplained weight gain/loss. Past Medical History/Comorbidities:   Ms. Manuel Kumar  has a past medical history of Stroke [facility]). She also has no past medical history of Abnormal glandular Papanicolaou smear of cervix; Acquired hypothyroidism; Adult physical abuse; Anemia NEC;  Asthma; Chronic kidney disease, unspecified; Diabetes mellitus; Essential hypertension; Female infertility of unspecified origin; Heart abnormalities; Herpes simplex without mention of complication; Human immunodeficiency virus (HIV) disease ([facility] OTHER MEDICAL; Liver disease; Phlebitis and thrombophlebitis of unspecified site; Postpartum depression; Psychiatric problem; Rhesus isoimmunization unspecified as to episode of care in pregnancy; Shock due to anesthesia not elsewhere classified; Sickle-cell disease, unspecified ([facility] Systemic lupus erythematosus ([facility] Unspecified breast disorder; Unspecified diseases of blood and blood-forming organs; or Unspecified epilepsy without mention of intractable epilepsy ([facility] Ms. Haresh Gray  has a past surgical history that includes neurological procedure unlisted. Social History/Living Environment:     Lives in a private home with her  and two children  Prior Level of Function/Work/Activity:  Patient is very active with daily workouts and running. She works full time with [employer]. Dominant Side:         BILATERAL  Current Medications:       Current Outpatient Prescriptions:     ibuprofen (MOTRIN) 400 mg tablet, take 1 Tab by mouth every four (4) hours as needed for Pain., Disp: 30 Tab, Rfl: 1     Date Last Reviewed:  9/5/2017     Number of Personal Factors/Comorbidities that affect the Plan of Care: 1-2: MODERATE COMPLEXITY   EXAMINATION:   Observation/Orthostatic Postural Assessment:          Patient denies any LE pain, paresthesia or symptoms. Patient denies any increase of symptoms with cough, sneeze or valsalva. Patient denies any saddle paresthesia or bowel/bladder deficits. Observation/Orthostatic Postural Assessment: Patient exhibits a increased lumbar lordosis and convex right type I curve in mid-thoracic spine. Lower extremity weight bearing is slight increased left. Observation of gait indicates challenges with pelvic mobility  (patient is a pelvic/hip walker versus an efficient trunk walker).  Lower quadrant bony landmarks are right elevated compared to left. Leg swing for innominate mobility indicates bilateral limitations into innominate extension. Vertical compression test (VCT) for alignment is 4-. Elbow flexion test (EFT) for motor activation is 3. Soft tissue observation indicates restrictions noted in bilateral iliopsoas, hamstrings, right greater than left, piriformis right greater than left. Palpation/Tissue, Texture, Tension, Tonal and Length Abnormalities: Myofascial assessment indicates restrictions into thoracolumbar fascia. Muscle length testing in modified Harley position exhibits restricted right greater than left. Hamstring flexibility tested supine with straight leg raise (SLR) is restricted right greater than left. Piriformis length is restricted right greater than left. Quadriceps flexibility tested prone with heel to buttock is moderate restrictions right. Ely test is positive for rectus femoris tightness. Gastrocnemius and soleus flexibility are restricted right. Sacrotuberous ligament is right restrictions. Sacrococcygeal junction exhibits right rotated. Body of coccyx is flexed. ROM: Passive trunk rotation is 90% available to the R and 90% available to the L. Kinetic testing in standing including forward bend test is positive left. Marcher's test is positive left. Pelvic shear test is standing exhibits decreased excursion of bilateral shear with a hard end feel. Single plane active movements through lumbar spine in standing exhibit WFL, tightness into hamstrings, right greater than left. Functional squat for LE clearance is WFL. AROM (PROM) Right Left   Hip flexion/Innominate flexion 90 90   Hip extension/Innominate extension 8 8   Hip external rotation (ER) 25 25   Hip internal rotation (IR) 20 20   Hip abduction 45 45   Strength: Lumbar protective mechanism (LPM) are present for initiation.    LPM Strength */5   R anterior to posterior diagonal 3   L anterior to posterior diagonal 3   R posterior to anterior diagonal 4   L posterior to anterior diagonal 4     Manual Muscle Test (*/5) Right Left   Knee extension 4+ 5   Knee flexion 4+ 5   Hip flexion 4 5   Hip ER 4 5   Hip IR 4 5   Hip extension 4- 4   Hip abduction 4- 4   Hip adduction 5 5   Ankle DF 4- 5   Ankle PF 5 5   Core stability 4/5     Special Tests:  Carlos Chol test is negative. Scours test is negative  Neurological Screen:  Myotomes: Key muscle strength testing through bilateral LE is intact. Dermatomes: Sensation testing through bilateral lower quadrants for light touch is intact. Reflexes: Patellar (L4) and Achilles (S1) are 2+ and WFL. Neural tension tests: Passive straight leg raise (SLR) test is negative. Slump test is negative. Functional Mobility:  Overall demonstrates good mobility with functional tasks, challenged with balance in single limb stance       Body Structures Involved:  1. Nerves  2. Joints  3. Muscles Body Functions Affected:  1. Sensory/Pain  2. Neuromusculoskeletal  3. Movement Related Activities and Participation Affected:  1. Mobility  2. Community, Social and [place]   Number of elements (examined above) that affect the Plan of Care: 4+: HIGH COMPLEXITY   CLINICAL PRESENTATION:   Presentation: Evolving clinical presentation with changing clinical characteristics: MODERATE COMPLEXITY   CLINICAL DECISION MAKING:   Outcome Measure: Tool Used: Modified Oswestry Low Back Pain Questionnaire  Score:  Initial: 3/50  Most Recent: X/50 (Date: -- )   Interpretation of Score: Each section is scored on a 0-5 scale, 5 representing the greatest disability. The scores of each section are added together for a total score of 50. Medical Necessity:   · Patient is expected to demonstrate progress in strength, range of motion, balance, coordination and functional technique to increase independence with ambulation and balance to prepare for her testing for the [facility]   Reason for Services/Other Comments:  · Patient continues to require skilled intervention due to she would benefit from additional training in endurance of RLE and lumbar spine  and to improve overall mobility, stability and balance. .   Use of outcome tool(s) and clinical judgement create a POC that gives a: Questionable prediction of patient's progress: MODERATE COMPLEXITY            TREATMENT:   (In addition to Assessment/Re-Assessment sessions the following treatments were rendered)  Pre-treatment Symptoms/Complaints:  Patient notes less back pain this week overall, noting improved mobility of RLE after PT sessions and lasts for 3-4 days. Plans to follow up with orthotist today for an appointment for her AFO. Pain: Initial:   Pain Intensity 1: 3  Pain Location 1: Spine, lumbar  Pain Orientation 1: Right  Post Session:  1/10     Therapeutic Exercise: (10 Minutes):  Exercises per grid below to improve mobility, strength, balance and coordination. Required minimal verbal and manual cues to promote proper body alignment, promote proper body posture, promote proper body mechanics and promote proper body breathing techniques. Progressed resistance, range, repetitions and complexity of movement as indicated. Date:  9/5/2017   Activity/Exercise Parameters   Balance exercises    Single limb stance     Quadriped posterior depression    Treadmill ambulation    Review HEP X 5 minute review of HEP, self stretches, PNF techniques, lifting techniques   Side lying left - right hip extension    Side lying for right anterior elevation and posterior depression of pelvis X 5 reps, 30 sec holds   Prone hip ER/IR strengthening X 10 reps, 10 sec holds     Manual Therapy (    Soft Tissue Mobilization Duration  Duration: 40 Minutes): Manual techniques to facilitate improved motion and decreased pain.   · Supine bilateral iliopsoas release with FMP of lower trunk rotation  · Supine right hamstrings, soft tissue mobilization right greater than left  · Prone hip on axis right, manual resistance and NMR into hip ER and IR. · Prone sacral mobilizations, p/a with lower trunk rotation  · With written consent received and precautions reviewed, instrument-assisted soft tissue mobilization was performed to right gluteus medius, minimus and piriformis for the purpose of activation with a positive treatment effect and no complications noted. · Side lying left for right prolonged holds into posterior depression and manual resistance into anterior elevation of pelvis, with hip flexion, hip adduction and ER and ankle DF    (Used abbreviations: MET - muscle energy technique; PNF - proprioceptive neuromuscular facilitation; NMR - neuromuscular re-education; a/p - anterior to posterior; p/a - posterior to anterior, FMP - functional movement patterns)        Treatment/Session Assessment:    · Response to Treatment: improved RLE mobility and improved stability through hip rotation after treatment today. · Compliance with Program/Exercises: compliant all of the time. · Recommendations/Intent for next treatment session: \"Next visit will focus on advancements to more challenging activities\".   Total Treatment Duration:  PT Patient Time In/Time Out  Time In: 0740  Time Out: [address] [facility] Sallie Lundy PT

## 2017-09-11 ENCOUNTER — HOSPITAL ENCOUNTER (OUTPATIENT)
Dept: PHYSICAL THERAPY | Age: 37
Discharge: HOME | End: 2017-09-11
Payer: COMMERCIAL

## 2017-09-11 PROCEDURE — 97140 MANUAL THERAPY 1/> REGIONS: CPT

## 2017-09-11 PROCEDURE — 97116 GAIT TRAINING THERAPY: CPT

## 2017-09-11 NOTE — PROGRESS NOTES
Margarita Forte  : 1980 [facility] [address]  [phone]   [phone]        OUTPATIENT PHYSICAL THERAPY:Daily Note 2017    ICD-10: Treatment Diagnosis: low back pain M54.5  ICD-10: Treatment Diagnosis: difficulty walking R26.2  ICD-10: Treatment Diagnosis: stiffness in joint; right foot M25.674  Precautions/Allergies:   Sulfa (sulfonamide antibiotics)   Fall Risk Score: 0 (? 5 = High Risk)  MD Orders: self referred MEDICAL/REFERRING DIAGNOSIS:  Foot pain [M79.673]   DATE OF ONSET: chronic  REFERRING PHYSICIAN: Catarina Heller MD  RETURN PHYSICIAN APPOINTMENT: as needed     RE-ASSESSMENT:  Ms. Amalia Solis presents to physical therapy with history of low back pain/right buttock pain and antalgic gait. She presents with limitations in RLE ROM, especially into her foot and ankle, with limited dorsiflexion. She has a history of a cavernous brainstem resulting in challenges with balance and gait, however patient is very motivated and demonstrates great strength through UE and LE. She would benefit from skilled physical therapy to improve her overall mobility, stability, balance and neuromuscular facilitation. PROBLEM LIST (Impacting functional limitations):  1. Decreased Strength  2. Decreased ADL/Functional Activities  3. Decreased Ambulation Ability/Technique  4. Decreased Balance INTERVENTIONS PLANNED:  1. Balance Exercise  2. Home Exercise Program (HEP)  3. Manual Therapy  4. Neuromuscular Re-education/Strengthening  5. Range of Motion (ROM)  6. Therapeutic Activites  7. Therapeutic Exercise/Strengthening   TREATMENT PLAN:  Effective Dates: 17 TO 10/11/17  Frequency/Duration: 1x a week for 12 weeks  GOALS: (Goals have been discussed and agreed upon with patient.)  Discharge Goals: Time Frame: 4 weeks  1.  Patient demonstrates improved balance with ability to stand for 60 seconds single leg without loss of balance. - ONGOING  2. Patient able to to demonstrates improved pelvic mobility with gait, for 300' without verbal cueing or resistance from therapist. - ONGOING  3. Patient demonstrated ability to run 2 miles without onset of discomfort or loss of balance. - ONGOING  4. Patient able to perform balance exercises for 15 minutes with no more than 3 loss of balances. Rehabilitation Potential For Stated Goals: Excellent  Regarding Yvonne Forte's therapy, I certify that the treatment plan above will be carried out by a therapist or under their direction. Thank you for this referral,  Amara Buckner PT     Referring Physician Signature: Nan Nash MD              Date                    The information in this section was collected on 7/19/17 (except where otherwise noted). HISTORY:   History of Present Injury/Illness (Reason for Referral):  Patient has history of a cavernous brainstem, history of stroke affecting her right side. She has a chronic history of intermittent low back pain and challenges with mobility into right foot/ankle. She is very motivated and performs daily exercises and strength training to improve her overall mobility and function. She has decided she wants to try out to join the [employer] and to date has passed 99% of her testing. :Unfortunately she was denied by the the [employer] reserves, however she continues to want to improve her overall strength and balance. She has also expressed interest in obtaining a new AFO. Patient denies dizziness, drop attacks, numbness/tingling, bowel/bladder dysfunction and/or unexplained weight gain/loss. Past Medical History/Comorbidities:   Ms. Tila Douglas  has a past medical history of Stroke [facility]). She also has no past medical history of Abnormal glandular Papanicolaou smear of cervix; Acquired hypothyroidism; Adult physical abuse; Anemia NEC;  Asthma; Chronic kidney disease, unspecified; Diabetes mellitus; Essential hypertension; Female infertility of unspecified origin; Heart abnormalities; Herpes simplex without mention of complication; Human immunodeficiency virus (HIV) disease ([facility] OTHER MEDICAL; Liver disease; Phlebitis and thrombophlebitis of unspecified site; Postpartum depression; Psychiatric problem; Rhesus isoimmunization unspecified as to episode of care in pregnancy; Shock due to anesthesia not elsewhere classified; Sickle-cell disease, unspecified ([facility] [address] Systemic lupus erythematosus ([facility] [address] Unspecified breast disorder; Unspecified diseases of blood and blood-forming organs; or Unspecified epilepsy without mention of intractable epilepsy ([facility] Ms. Jonathon Viera  has a past surgical history that includes neurological procedure unlisted. Social History/Living Environment:     Lives in a private home with her  and two children  Prior Level of Function/Work/Activity:  Patient is very active with daily workouts and running. She works full time with [employer]. Dominant Side:         BILATERAL  Current Medications:       Current Outpatient Prescriptions:     ibuprofen (MOTRIN) 400 mg tablet, take 1 Tab by mouth every four (4) hours as needed for Pain., Disp: 30 Tab, Rfl: 1     Date Last Reviewed:  9/11/2017     Number of Personal Factors/Comorbidities that affect the Plan of Care: 1-2: MODERATE COMPLEXITY   EXAMINATION:   Observation/Orthostatic Postural Assessment:          Patient denies any LE pain, paresthesia or symptoms. Patient denies any increase of symptoms with cough, sneeze or valsalva. Patient denies any saddle paresthesia or bowel/bladder deficits. Observation/Orthostatic Postural Assessment: Patient exhibits a increased lumbar lordosis and convex right type I curve in mid-thoracic spine. Lower extremity weight bearing is slight increased left. Observation of gait indicates challenges with pelvic mobility  (patient is a pelvic/hip walker versus an efficient trunk walker).  Lower quadrant bony landmarks are right elevated compared to left. Leg swing for innominate mobility indicates bilateral limitations into innominate extension. Vertical compression test (VCT) for alignment is 4-. Elbow flexion test (EFT) for motor activation is 3. Soft tissue observation indicates restrictions noted in bilateral iliopsoas, hamstrings, right greater than left, piriformis right greater than left. Palpation/Tissue, Texture, Tension, Tonal and Length Abnormalities: Myofascial assessment indicates restrictions into thoracolumbar fascia. Muscle length testing in modified Harley position exhibits restricted right greater than left. Hamstring flexibility tested supine with straight leg raise (SLR) is restricted right greater than left. Piriformis length is restricted right greater than left. Quadriceps flexibility tested prone with heel to buttock is moderate restrictions right. Ely test is positive for rectus femoris tightness. Gastrocnemius and soleus flexibility are restricted right. Sacrotuberous ligament is right restrictions. Sacrococcygeal junction exhibits right rotated. Body of coccyx is flexed. ROM: Passive trunk rotation is 90% available to the R and 90% available to the L. Kinetic testing in standing including forward bend test is positive left. Marcher's test is positive left. Pelvic shear test is standing exhibits decreased excursion of bilateral shear with a hard end feel. Single plane active movements through lumbar spine in standing exhibit WFL, tightness into hamstrings, right greater than left. Functional squat for LE clearance is WFL. AROM (PROM) Right Left   Hip flexion/Innominate flexion 90 90   Hip extension/Innominate extension 8 8   Hip external rotation (ER) 25 25   Hip internal rotation (IR) 20 20   Hip abduction 45 45   Strength: Lumbar protective mechanism (LPM) are present for initiation.    LPM Strength */5   R anterior to posterior diagonal 3   L anterior to posterior diagonal 3   R posterior to anterior diagonal 4   L posterior to anterior diagonal 4     Manual Muscle Test (*/5) Right Left   Knee extension 4+ 5   Knee flexion 4+ 5   Hip flexion 4 5   Hip ER 4 5   Hip IR 4 5   Hip extension 4- 4   Hip abduction 4- 4   Hip adduction 5 5   Ankle DF 4- 5   Ankle PF 5 5   Core stability 4/5     Special Tests:  Fergus Shed test is negative. Scours test is negative  Neurological Screen:  Myotomes: Key muscle strength testing through bilateral LE is intact. Dermatomes: Sensation testing through bilateral lower quadrants for light touch is intact. Reflexes: Patellar (L4) and Achilles (S1) are 2+ and WFL. Neural tension tests: Passive straight leg raise (SLR) test is negative. Slump test is negative. Functional Mobility:  Overall demonstrates good mobility with functional tasks, challenged with balance in single limb stance       Body Structures Involved:  1. Nerves  2. Joints  3. Muscles Body Functions Affected:  1. Sensory/Pain  2. Neuromusculoskeletal  3. Movement Related Activities and Participation Affected:  1. Mobility  2. Community, Social and [place]   Number of elements (examined above) that affect the Plan of Care: 4+: HIGH COMPLEXITY   CLINICAL PRESENTATION:   Presentation: Evolving clinical presentation with changing clinical characteristics: MODERATE COMPLEXITY   CLINICAL DECISION MAKING:   Outcome Measure: Tool Used: Modified Oswestry Low Back Pain Questionnaire  Score:  Initial: 3/50  Most Recent: X/50 (Date: -- )   Interpretation of Score: Each section is scored on a 0-5 scale, 5 representing the greatest disability. The scores of each section are added together for a total score of 50. Medical Necessity:   · Patient is expected to demonstrate progress in strength, range of motion, balance, coordination and functional technique to increase independence with ambulation and balance to prepare for her testing for the [facility]   Reason for Services/Other Comments:  · Patient continues to require skilled intervention due to she would benefit from additional training in endurance of RLE and lumbar spine  and to improve overall mobility, stability and balance. .   Use of outcome tool(s) and clinical judgement create a POC that gives a: Questionable prediction of patient's progress: MODERATE COMPLEXITY            TREATMENT:   (In addition to Assessment/Re-Assessment sessions the following treatments were rendered)  Pre-treatment Symptoms/Complaints:  Patient was fitted for an AFO on Friday and looking forward to try it out in therapy today. Noted with the time she had it on during the fitting it helped with clearing her right foot. Pain: Initial:   Pain Intensity 1: 2  Pain Location 1: Spine, lumbar  Pain Orientation 1: Right  Post Session:  1/10     Therapeutic Exercise: (5 Minutes):  Exercises per grid below to improve mobility, strength, balance and coordination. Required minimal verbal and manual cues to promote proper body alignment, promote proper body posture, promote proper body mechanics and promote proper body breathing techniques. Progressed resistance, range, repetitions and complexity of movement as indicated. Date:  9/11/2017   Activity/Exercise Parameters   Balance exercises    Single limb stance     Quadriped posterior depression    Treadmill ambulation    Review HEP X 5 minute review of HEP, self stretches, PNF techniques, lifting techniques, hip abduction and extension strengthening. Side lying left - right hip extension    Side lying for right anterior elevation and posterior depression of pelvis    Prone hip ER/IR strengthening      Gait Training (15 minutes): Training with AFO placed on right foot, exercises including ambulation, high knees, phases of gait from weight acceptance to push off. Walked 1 lap with focus on gait, ran 3 laps. 3 flights of stairs ascending/decending with no loss of balance.   Patient demonstrates weakness through bilateral hips, right greater than left, with positive trendelenburg right. Improved gait and foot clearance noted with use of AFO. No tripping or loss of balance noted. Manual Therapy (    Soft Tissue Mobilization Duration  Duration: 25 Minutes): Manual techniques to facilitate improved motion and decreased pain. · Supine right iliopsoas release with FMP of lower trunk rotation  · Supine right hamstrings, soft tissue mobilization right greater than left  · Prone hip on axis right, manual resistance and NMR into hip ER and IR. · With written consent received and precautions reviewed, instrument-assisted soft tissue mobilization was performed to right gluteus medius, minimus and piriformis for the purpose of activation with a positive treatment effect and no complications noted. · Side lying left for right prolonged holds into posterior depression and manual resistance into anterior elevation of pelvis, with hip flexion, hip adduction and ER and ankle DF    (Used abbreviations: MET - muscle energy technique; PNF - proprioceptive neuromuscular facilitation; NMR - neuromuscular re-education; a/p - anterior to posterior; p/a - posterior to anterior, FMP - functional movement patterns)        Treatment/Session Assessment:    · Response to Treatment: demonstrates improved gait with use of AFO, improved balance, improved clearance of right foot with no loss of balance or tripping over her right foot. Notes improved vazquez with her gait. Patient would benefit from an AFO for daily use during her daily activities from work, driving and walking on unlevel terrains including her children sports locations. · Compliance with Program/Exercises: compliant all of the time. · Recommendations/Intent for next treatment session: \"Next visit will focus on advancements to more challenging activities\".   Total Treatment Duration:  PT Patient Time In/Time Out  Time In: 7018  Time Out: 7983 [facility] Jeanette Caldwell PT

## 2017-09-19 ENCOUNTER — HOSPITAL ENCOUNTER (OUTPATIENT)
Dept: PHYSICAL THERAPY | Age: 37
Discharge: HOME | End: 2017-09-19
Payer: COMMERCIAL

## 2017-09-19 PROCEDURE — 97140 MANUAL THERAPY 1/> REGIONS: CPT

## 2017-09-19 PROCEDURE — 97110 THERAPEUTIC EXERCISES: CPT

## 2017-09-19 NOTE — PROGRESS NOTES
Luna Forte  : 1980 [facility] [address]  [phone]   [phone]        OUTPATIENT PHYSICAL THERAPY:Daily Note and Progress Report 2017    ICD-10: Treatment Diagnosis: low back pain M54.5  ICD-10: Treatment Diagnosis: difficulty walking R26.2  ICD-10: Treatment Diagnosis: stiffness in joint; right foot M25.674  Precautions/Allergies:   Sulfa (sulfonamide antibiotics)   Fall Risk Score: 0 (? 5 = High Risk)  MD Orders: self referred MEDICAL/REFERRING DIAGNOSIS:  Foot pain [M79.673]   DATE OF ONSET: chronic  REFERRING PHYSICIAN: José Antonio Psoey MD  RETURN PHYSICIAN APPOINTMENT: as needed     RE-ASSESSMENT:  Ms. Cruz Juan presents to physical therapy with history of low back pain/right buttock pain and antalgic gait. She presents with limitations in RLE ROM, especially into her foot and ankle, with limited dorsiflexion. She has a history of a cavernous brainstem resulting in challenges with balance and gait, however patient is very motivated and demonstrates great strength through UE and LE. She would benefit from skilled physical therapy to improve her overall mobility, stability, balance and neuromuscular facilitation. 17: Patient has demonstrates improved mobility with gait and increasing strength and activation of core and gluteal muscles. She continues to benefit from tool assisted soft tissue techniques to improve overall mobility through her RLE. She has used an AFO twice in therapy sessions and notes significant improved gait and no loss of balance. She would benefit from an AFO for daily use to help with ambulation, stairs and driving. She would benefit from continued physical therapy to continues to improve pelvic mobility and improve motor control through her pelvis and LE. PROBLEM LIST (Impacting functional limitations):  1. Decreased Strength  2.  Decreased ADL/Functional Activities  3. Decreased Ambulation Ability/Technique  4. Decreased Balance INTERVENTIONS PLANNED:  1. Balance Exercise  2. Home Exercise Program (HEP)  3. Manual Therapy  4. Neuromuscular Re-education/Strengthening  5. Range of Motion (ROM)  6. Therapeutic Activites  7. Therapeutic Exercise/Strengthening   TREATMENT PLAN:  Effective Dates: 7/19/17 TO 10/11/17  Frequency/Duration: 1x a week for 12 weeks  GOALS: (Goals have been discussed and agreed upon with patient.)  Discharge Goals: Time Frame: 4 weeks  1. Patient demonstrates improved balance with ability to stand for 60 seconds single leg without loss of balance. - ONGOING  2. Patient able to to demonstrates improved pelvic mobility with gait, for 300' without verbal cueing or resistance from therapist. - ALMOST MET  3. Patient demonstrated ability to run 2 miles without onset of discomfort or loss of balance. - ONGOING  4. Patient able to perform balance exercises for 15 minutes with no more than 3 loss of balances. ALMOST MET  Rehabilitation Potential For Stated Goals: Excellent  Regarding Ambrosio Forte's therapy, I certify that the treatment plan above will be carried out by a therapist or under their direction. Thank you for this referral,  Darell Scott, PT     Referring Physician Signature: Radha Ronquillo MD              Date                    The information in this section was collected on 7/19/17 (except where otherwise noted). HISTORY:   History of Present Injury/Illness (Reason for Referral):  Patient has history of a cavernous brainstem, history of stroke affecting her right side. She has a chronic history of intermittent low back pain and challenges with mobility into right foot/ankle. She is very motivated and performs daily exercises and strength training to improve her overall mobility and function.  She has decided she wants to try out to join the [employer] and to date has passed 99% of her testing. :Unfortunately she was denied by the the [employer] reserves, however she continues to want to improve her overall strength and balance. She has also expressed interest in obtaining a new AFO. Patient denies dizziness, drop attacks, numbness/tingling, bowel/bladder dysfunction and/or unexplained weight gain/loss. Past Medical History/Comorbidities:   Ms. Laura Brewer  has a past medical history of Stroke [facility]). She also has no past medical history of Abnormal glandular Papanicolaou smear of cervix; Acquired hypothyroidism; Adult physical abuse; Anemia NEC; Asthma; Chronic kidney disease, unspecified; Diabetes mellitus; Essential hypertension; Female infertility of unspecified origin; Heart abnormalities; Herpes simplex without mention of complication; Human immunodeficiency virus (HIV) disease ([facility] [address] OTHER MEDICAL; Liver disease; Phlebitis and thrombophlebitis of unspecified site; Postpartum depression; Psychiatric problem; Rhesus isoimmunization unspecified as to episode of care in pregnancy; Shock due to anesthesia not elsewhere classified; Sickle-cell disease, unspecified ([facility] [address] Systemic lupus erythematosus ([facility] [address] Unspecified breast disorder; Unspecified diseases of blood and blood-forming organs; or Unspecified epilepsy without mention of intractable epilepsy ([facility] [address] Ms. Laura Brewer  has a past surgical history that includes neurological procedure unlisted. Social History/Living Environment:     Lives in a private home with her  and two children  Prior Level of Function/Work/Activity:  Patient is very active with daily workouts and running. She works full time with [employer].   Dominant Side:         BILATERAL  Current Medications:       Current Outpatient Prescriptions:     ibuprofen (MOTRIN) 400 mg tablet, take 1 Tab by mouth every four (4) hours as needed for Pain., Disp: 30 Tab, Rfl: 1     Date Last Reviewed:  9/19/2017     Number of Personal Factors/Comorbidities that affect the Plan of Care: 1-2: MODERATE COMPLEXITY   EXAMINATION:   Observation/Orthostatic Postural Assessment:          Patient denies any LE pain, paresthesia or symptoms. Patient denies any increase of symptoms with cough, sneeze or valsalva. Patient denies any saddle paresthesia or bowel/bladder deficits. Observation/Orthostatic Postural Assessment: Patient exhibits a increased lumbar lordosis and convex right type I curve in mid-thoracic spine. Lower extremity weight bearing is slight increased left. Observation of gait indicates challenges with pelvic mobility  (patient is a pelvic/hip walker versus an efficient trunk walker). Lower quadrant bony landmarks are right elevated compared to left. Leg swing for innominate mobility indicates bilateral limitations into innominate extension. Vertical compression test (VCT) for alignment is 4. Elbow flexion test (EFT) for motor activation is 4-. Soft tissue observation indicates restrictions noted in bilateral iliopsoas, hamstrings, right greater than left, piriformis right greater than left. improving  Palpation/Tissue, Texture, Tension, Tonal and Length Abnormalities: Myofascial assessment indicates restrictions into thoracolumbar fascia. Muscle length testing in modified Harley position exhibits restricted right greater than left improving . Hamstring flexibility tested supine with straight leg raise (SLR) is restricted right greater than left. Piriformis length is restricted right greater than left improving. Quadriceps flexibility tested prone with heel to buttock is moderate restrictions right. Ely test is positive for rectus femoris tightness improving. Gastrocnemius and soleus flexibility are restricted right. Sacrotuberous ligament is right restrictions. Sacrococcygeal junction exhibits right rotated. Body of coccyx is flexed. ROM: Passive trunk rotation is 90% available to the R and 90% available to the L. Kinetic testing in standing including forward bend test is positive left.  Marcher's test is positive left. Pelvic shear test is standing exhibits decreased excursion of bilateral shear with a hard end feel. Single plane active movements through lumbar spine in standing exhibit WFL, tightness into hamstrings, right greater than left. Functional squat for LE clearance is WFL. AROM (PROM) Right Left   Hip flexion/Innominate flexion 90 90   Hip extension/Innominate extension 8 8   Hip external rotation (ER) 25 25   Hip internal rotation (IR) 20 20   Hip abduction 45 45   Strength: Lumbar protective mechanism (LPM) are present for initiation. LPM Strength */5   R anterior to posterior diagonal 3+   L anterior to posterior diagonal 3+   R posterior to anterior diagonal 4+   L posterior to anterior diagonal 4+     Manual Muscle Test (*/5) Right Left   Knee extension 5 5   Knee flexion 5 5   Hip flexion 4 5   Hip ER 4 5   Hip IR 4 5   Hip extension 4 4   Hip abduction 4 4   Hip adduction 5 5   Ankle DF 4- 5   Ankle PF 5 5   Core stability 4/5     Special Tests:  Nuvia Coventry test is negative. Scours test is negative  Neurological Screen:  Myotomes: Key muscle strength testing through bilateral LE is intact. Dermatomes: Sensation testing through bilateral lower quadrants for light touch is intact. Reflexes: Patellar (L4) and Achilles (S1) are 2+ and WFL. Neural tension tests: Passive straight leg raise (SLR) test is negative. Slump test is negative. Functional Mobility:  Overall demonstrates good mobility with functional tasks, challenged with balance in single limb stance       Body Structures Involved:  1. Nerves  2. Joints  3. Muscles Body Functions Affected:  1. Sensory/Pain  2. Neuromusculoskeletal  3. Movement Related Activities and Participation Affected:  1. Mobility  2.  Community, Social and [place]   Number of elements (examined above) that affect the Plan of Care: 4+: HIGH COMPLEXITY   CLINICAL PRESENTATION:   Presentation: Evolving clinical presentation with changing clinical characteristics: MODERATE COMPLEXITY   CLINICAL DECISION MAKING:   Outcome Measure: Tool Used: Modified Oswestry Low Back Pain Questionnaire  Score:  Initial: 3/50  Most Recent: X/50 (Date: -- )   Interpretation of Score: Each section is scored on a 0-5 scale, 5 representing the greatest disability. The scores of each section are added together for a total score of 50. Medical Necessity:   · Patient is expected to demonstrate progress in strength, range of motion, balance, coordination and functional technique to increase independence with ambulation and balance to prepare for her testing for the [facility] Reason for Services/Other Comments:  · Patient continues to require skilled intervention due to she would benefit from additional training in endurance of RLE and lumbar spine  and to improve overall mobility, stability and balance. .   Use of outcome tool(s) and clinical judgement create a POC that gives a: Questionable prediction of patient's progress: MODERATE COMPLEXITY            TREATMENT:   (In addition to Assessment/Re-Assessment sessions the following treatments were rendered)  Pre-treatment Symptoms/Complaints:  Patient notes walking in her AFO has helped her overall mobility and improved balance. Pain: Initial:   Pain Intensity 1: 2  Pain Location 1: Buttocks, Spine, lumbar  Pain Orientation 1: Right  Post Session:  1/10     Therapeutic Exercise: (10 Minutes):  Exercises per grid below to improve mobility, strength, balance and coordination. Required minimal verbal and manual cues to promote proper body alignment, promote proper body posture, promote proper body mechanics and promote proper body breathing techniques. Progressed resistance, range, repetitions and complexity of movement as indicated.    Date:  9/19/2017   Activity/Exercise Parameters   Balance exercises    Single limb stance     Quadriped posterior depression    ambulation X 5 minutes outside walking to assess AFO Review HEP X 5 minute review of HEP, self stretches, PNF techniques, lifting techniques, hip abduction and extension strengthening. Side lying left - right hip extension    Side lying for right anterior elevation and posterior depression of pelvis    Prone hip ER/IR strengthening        Manual Therapy (    Soft Tissue Mobilization Duration  Duration: 35 Minutes): Manual techniques to facilitate improved motion and decreased pain. · Supine right iliopsoas release with FMP of lower trunk rotation  · Supine right hamstrings, soft tissue mobilization right greater than left  · Prone hip on axis right, manual resistance and NMR into hip ER and IR. · With written consent received and precautions reviewed, instrument-assisted soft tissue mobilization was performed to right gluteus medius, minimus and piriformis for the purpose of activation with a positive treatment effect and no complications noted. · Side lying left for right prolonged holds into posterior depression and manual resistance into anterior elevation of pelvis, with hip flexion, hip adduction and ER and ankle DF  · Quadriped posterior pelvic depression with hip extension, prolonged holds with manual resistance. (Used abbreviations: MET - muscle energy technique; PNF - proprioceptive neuromuscular facilitation; NMR - neuromuscular re-education; a/p - anterior to posterior; p/a - posterior to anterior, FMP - functional movement patterns)        Treatment/Session Assessment:    · Response to Treatment: improved gait and balance with AFO, able to clear foot on level and unlevel surfaces, improve vazquez without loss of balance. · Compliance with Program/Exercises: compliant all of the time. · Recommendations/Intent for next treatment session: \"Next visit will focus on advancements to more challenging activities\".   Total Treatment Duration:  PT Patient Time In/Time Out  Time In: 0945  Time Out: [address] Agustin Schultz PT

## 2017-10-04 ENCOUNTER — HOSPITAL ENCOUNTER (OUTPATIENT)
Dept: PHYSICAL THERAPY | Age: 37
Discharge: HOME | End: 2017-10-04
Payer: COMMERCIAL

## 2017-10-04 PROCEDURE — 97110 THERAPEUTIC EXERCISES: CPT

## 2017-10-04 PROCEDURE — 97140 MANUAL THERAPY 1/> REGIONS: CPT

## 2017-10-04 NOTE — PROGRESS NOTES
Nel Forte  : [facility] [address]  [phone]   [phone]        OUTPATIENT PHYSICAL THERAPY:Daily Note 10/4/2017    ICD-10: Treatment Diagnosis: low back pain M54.5  ICD-10: Treatment Diagnosis: difficulty walking R26.2  ICD-10: Treatment Diagnosis: stiffness in joint; right foot M25.674  Precautions/Allergies:   Sulfa (sulfonamide antibiotics)   Fall Risk Score: 0 (? 5 = High Risk)  MD Orders: self referred MEDICAL/REFERRING DIAGNOSIS:  Foot pain [M79.673]   DATE OF ONSET: chronic  REFERRING PHYSICIAN: Ann Westbrook MD  RETURN PHYSICIAN APPOINTMENT: as needed     RE-ASSESSMENT:  Ms. Rich Armenta presents to physical therapy with history of low back pain/right buttock pain and antalgic gait. She presents with limitations in RLE ROM, especially into her foot and ankle, with limited dorsiflexion. She has a history of a cavernous brainstem resulting in challenges with balance and gait, however patient is very motivated and demonstrates great strength through UE and LE. She would benefit from skilled physical therapy to improve her overall mobility, stability, balance and neuromuscular facilitation. 17: Patient has demonstrates improved mobility with gait and increasing strength and activation of core and gluteal muscles. She continues to benefit from tool assisted soft tissue techniques to improve overall mobility through her RLE. She has used an AFO twice in therapy sessions and notes significant improved gait and no loss of balance. She would benefit from an AFO for daily use to help with ambulation, stairs and driving. She would benefit from continued physical therapy to continues to improve pelvic mobility and improve motor control through her pelvis and LE. PROBLEM LIST (Impacting functional limitations):  1. Decreased Strength  2. Decreased ADL/Functional Activities  3.  Decreased Ambulation Ability/Technique  4. Decreased Balance INTERVENTIONS PLANNED:  1. Balance Exercise  2. Home Exercise Program (HEP)  3. Manual Therapy  4. Neuromuscular Re-education/Strengthening  5. Range of Motion (ROM)  6. Therapeutic Activites  7. Therapeutic Exercise/Strengthening   TREATMENT PLAN:  Effective Dates: 7/19/17 TO 10/11/17  Frequency/Duration: 1x a week for 12 weeks  GOALS: (Goals have been discussed and agreed upon with patient.)  Discharge Goals: Time Frame: 4 weeks  1. Patient demonstrates improved balance with ability to stand for 60 seconds single leg without loss of balance. - ONGOING  2. Patient able to to demonstrates improved pelvic mobility with gait, for 300' without verbal cueing or resistance from therapist. - ALMOST MET  3. Patient demonstrated ability to run 2 miles without onset of discomfort or loss of balance. - ONGOING  4. Patient able to perform balance exercises for 15 minutes with no more than 3 loss of balances. ALMOST MET  Rehabilitation Potential For Stated Goals: Excellent              The information in this section was collected on 7/19/17 (except where otherwise noted). HISTORY:   History of Present Injury/Illness (Reason for Referral):  Patient has history of a cavernous brainstem, history of stroke affecting her right side. She has a chronic history of intermittent low back pain and challenges with mobility into right foot/ankle. She is very motivated and performs daily exercises and strength training to improve her overall mobility and function. She has decided she wants to try out to join the [employer] and to date has passed 99% of her testing. :Unfortunately she was denied by the the "[employer] reserves, however she continues to want to improve her overall strength and balance. She has also expressed interest in obtaining a new AFO. Patient denies dizziness, drop attacks, numbness/tingling, bowel/bladder dysfunction and/or unexplained weight gain/loss.     Past Medical History/Comorbidities:   Ms. Bean Lee  has a past medical history of Stroke [facility]). She also has no past medical history of Abnormal glandular Papanicolaou smear of cervix; Acquired hypothyroidism; Adult physical abuse; Anemia NEC; Asthma; Chronic kidney disease, unspecified; Diabetes mellitus; Essential hypertension; Female infertility of unspecified origin; Heart abnormalities; Herpes simplex without mention of complication; Human immunodeficiency virus (HIV) disease ([facility] [address] OTHER MEDICAL; Liver disease; Phlebitis and thrombophlebitis of unspecified site; Postpartum depression; Psychiatric problem; Rhesus isoimmunization unspecified as to episode of care in pregnancy; Shock due to anesthesia not elsewhere classified; Sickle-cell disease, unspecified ([facility] [address] Systemic lupus erythematosus ([facility] [address] Unspecified breast disorder; Unspecified diseases of blood and blood-forming organs; or Unspecified epilepsy without mention of intractable epilepsy ([facility] [address] Ms. Bean Lee  has a past surgical history that includes neurological procedure unlisted. Social History/Living Environment:     Lives in a private home with her  and two children  Prior Level of Function/Work/Activity:  Patient is very active with daily workouts and running. She works full time with [employer]. Dominant Side:         BILATERAL  Current Medications:       Current Outpatient Prescriptions:     ibuprofen (MOTRIN) 400 mg tablet, take 1 Tab by mouth every four (4) hours as needed for Pain., Disp: 30 Tab, Rfl: 1     Date Last Reviewed:  10/4/2017     Number of Personal Factors/Comorbidities that affect the Plan of Care: 1-2: MODERATE COMPLEXITY   EXAMINATION:   Observation/Orthostatic Postural Assessment:          Patient denies any LE pain, paresthesia or symptoms. Patient denies any increase of symptoms with cough, sneeze or valsalva. Patient denies any saddle paresthesia or bowel/bladder deficits.      Observation/Orthostatic Postural Assessment: Patient exhibits a increased lumbar lordosis and convex right type I curve in mid-thoracic spine. Lower extremity weight bearing is slight increased left. Observation of gait indicates challenges with pelvic mobility  (patient is a pelvic/hip walker versus an efficient trunk walker). Lower quadrant bony landmarks are right elevated compared to left. Leg swing for innominate mobility indicates bilateral limitations into innominate extension. Vertical compression test (VCT) for alignment is 4. Elbow flexion test (EFT) for motor activation is 4-. Soft tissue observation indicates restrictions noted in bilateral iliopsoas, hamstrings, right greater than left, piriformis right greater than left. improving  Palpation/Tissue, Texture, Tension, Tonal and Length Abnormalities: Myofascial assessment indicates restrictions into thoracolumbar fascia. Muscle length testing in modified Harley position exhibits restricted right greater than left improving . Hamstring flexibility tested supine with straight leg raise (SLR) is restricted right greater than left. Piriformis length is restricted right greater than left improving. Quadriceps flexibility tested prone with heel to buttock is moderate restrictions right. Ely test is positive for rectus femoris tightness improving. Gastrocnemius and soleus flexibility are restricted right. Sacrotuberous ligament is right restrictions. Sacrococcygeal junction exhibits right rotated. Body of coccyx is flexed. ROM: Passive trunk rotation is 90% available to the R and 90% available to the L. Kinetic testing in standing including forward bend test is positive left. Marcher's test is positive left. Pelvic shear test is standing exhibits decreased excursion of bilateral shear with a hard end feel. Single plane active movements through lumbar spine in standing exhibit WFL, tightness into hamstrings, right greater than left. Functional squat for LE clearance is WFL.     AROM (PROM) Right Left   Hip flexion/Innominate flexion 90 90   Hip extension/Innominate extension 8 8   Hip external rotation (ER) 25 25   Hip internal rotation (IR) 20 20   Hip abduction 45 45   Strength: Lumbar protective mechanism (LPM) are present for initiation. LPM Strength */5   R anterior to posterior diagonal 3+   L anterior to posterior diagonal 3+   R posterior to anterior diagonal 4+   L posterior to anterior diagonal 4+     Manual Muscle Test (*/5) Right Left   Knee extension 5 5   Knee flexion 5 5   Hip flexion 4 5   Hip ER 4 5   Hip IR 4 5   Hip extension 4 4   Hip abduction 4 4   Hip adduction 5 5   Ankle DF 4- 5   Ankle PF 5 5   Core stability 4/5     Special Tests:  Emily Cooks test is negative. Scours test is negative  Neurological Screen:  Myotomes: Key muscle strength testing through bilateral LE is intact. Dermatomes: Sensation testing through bilateral lower quadrants for light touch is intact. Reflexes: Patellar (L4) and Achilles (S1) are 2+ and WFL. Neural tension tests: Passive straight leg raise (SLR) test is negative. Slump test is negative. Functional Mobility:  Overall demonstrates good mobility with functional tasks, challenged with balance in single limb stance       Body Structures Involved:  1. Nerves  2. Joints  3. Muscles Body Functions Affected:  1. Sensory/Pain  2. Neuromusculoskeletal  3. Movement Related Activities and Participation Affected:  1. Mobility  2. Community, Social and [place]   Number of elements (examined above) that affect the Plan of Care: 4+: HIGH COMPLEXITY   CLINICAL PRESENTATION:   Presentation: Evolving clinical presentation with changing clinical characteristics: MODERATE COMPLEXITY   CLINICAL DECISION MAKING:   Outcome Measure: Tool Used: Modified Oswestry Low Back Pain Questionnaire  Score:  Initial: 3/50  Most Recent: X/50 (Date: -- )   Interpretation of Score: Each section is scored on a 0-5 scale, 5 representing the greatest disability.   The scores of each section are added together for a total score of 50. Medical Necessity:   · Patient is expected to demonstrate progress in strength, range of motion, balance, coordination and functional technique to increase independence with ambulation and balance to prepare for her testing for the [facility] Reason for Services/Other Comments:  · Patient continues to require skilled intervention due to she would benefit from additional training in endurance of RLE and lumbar spine  and to improve overall mobility, stability and balance. .   Use of outcome tool(s) and clinical judgement create a POC that gives a: Questionable prediction of patient's progress: MODERATE COMPLEXITY            TREATMENT:   (In addition to Assessment/Re-Assessment sessions the following treatments were rendered)  Pre-treatment Symptoms/Complaints:  Patient notes new AFO is working well, has been able to walk longer distances through airports and [facility] as she was visiting her father. Pain: Initial:   Pain Intensity 1: 2  Pain Location 1: Buttocks, Spine, lumbar  Pain Orientation 1: Right  Post Session:  1/10     Therapeutic Exercise: (10 Minutes):  Exercises per grid below to improve mobility, strength, balance and coordination. Required minimal verbal and manual cues to promote proper body alignment, promote proper body posture, promote proper body mechanics and promote proper body breathing techniques. Progressed resistance, range, repetitions and complexity of movement as indicated. Date:  10/4/2017   Activity/Exercise Parameters   Balance exercises    Single limb stance     Quadriped posterior depression    ambulation    Review HEP X 5 minute review of HEP, self stretches, PNF techniques, lifting techniques, hip abduction and extension strengthening.    Side lying left - right hip extension    Side lying for right anterior elevation and posterior depression of pelvis X 10 reps, 10 sec holds   Prone hip ER/IR strengthening X 10 reps, 5 sec holds       Manual Therapy (    Soft Tissue Mobilization Duration  Duration: 35 Minutes): Manual techniques to facilitate improved motion and decreased pain. · Supine right iliopsoas release with FMP of lower trunk rotation  · Supine right hamstrings, soft tissue mobilization right greater than left  · Prone hip on axis right, manual resistance and NMR into hip ER and IR. · With written consent received and precautions reviewed, instrument-assisted soft tissue mobilization was performed to right gluteus medius, minimus and piriformis for the purpose of activation with a positive treatment effect and no complications noted. · Side lying left for right prolonged holds into posterior depression and manual resistance into anterior elevation of pelvis, with hip flexion, hip adduction and ER and ankle DF    (Used abbreviations: MET - muscle energy technique; PNF - proprioceptive neuromuscular facilitation; NMR - neuromuscular re-education; a/p - anterior to posterior; p/a - posterior to anterior, FMP - functional movement patterns)        Treatment/Session Assessment:    · Response to Treatment: improving mobility in pelvis and right LE, decreased soft tissue restrictions noted in right buttocks  · Compliance with Program/Exercises: compliant all of the time. · Recommendations/Intent for next treatment session: \"Next visit will focus on advancements to more challenging activities\".   Total Treatment Duration:  PT Patient Time In/Time Out  Time In: 1040  Time Out: 1122 [facility] PT

## 2017-10-09 ENCOUNTER — HOSPITAL ENCOUNTER (OUTPATIENT)
Dept: PHYSICAL THERAPY | Age: 37
Discharge: HOME | End: 2017-10-09
Payer: COMMERCIAL

## 2017-10-09 PROCEDURE — 97140 MANUAL THERAPY 1/> REGIONS: CPT

## 2017-10-09 PROCEDURE — 97110 THERAPEUTIC EXERCISES: CPT

## 2017-10-09 NOTE — PROGRESS NOTES
Abdoul Forte  : 1980 [facility] [address]  [phone]   [phone]        OUTPATIENT PHYSICAL THERAPY:Daily Note and Recertification     ICD-10: Treatment Diagnosis: low back pain M54.5  ICD-10: Treatment Diagnosis: difficulty walking R26.2  ICD-10: Treatment Diagnosis: stiffness in joint; right foot M25.674  Precautions/Allergies:   Sulfa (sulfonamide antibiotics)   Fall Risk Score: 0 (? 5 = High Risk)  MD Orders: self referred MEDICAL/REFERRING DIAGNOSIS:  There are no admission diagnoses documented for this encounter. DATE OF ONSET: chronic  REFERRING PHYSICIAN: Stuart Anders MD  RETURN PHYSICIAN APPOINTMENT: as needed     RE-ASSESSMENT:  Ms. Refugio Dsouza presents to physical therapy with history of low back pain/right buttock pain and antalgic gait. She presents with limitations in RLE ROM, especially into her foot and ankle, with limited dorsiflexion. She has a history of a cavernous brainstem resulting in challenges with balance and gait, however patient is very motivated and demonstrates great strength through UE and LE. She would benefit from skilled physical therapy to improve her overall mobility, stability, balance and neuromuscular facilitation. 17: Patient has demonstrates improved mobility with gait and increasing strength and activation of core and gluteal muscles. She continues to benefit from tool assisted soft tissue techniques to improve overall mobility through her RLE. She has used an AFO twice in therapy sessions and notes significant improved gait and no loss of balance. She would benefit from an AFO for daily use to help with ambulation, stairs and driving. She would benefit from continued physical therapy to continues to improve pelvic mobility and improve motor control through her pelvis and LE.   10-9-17: Waldo Lott has continued to improve overall mobility through LE.  She is ambulating better with her new AFO however notes increased soreness in RLE musculature secondary to new LE patterns. She continues to have low back pain and right buttocks pain. She would benefit from continued physical therapy to improve overall mobility, stability and endurance through LE, pelvis and lumbar spine. PROBLEM LIST (Impacting functional limitations):  1. Decreased Strength  2. Decreased ADL/Functional Activities  3. Decreased Ambulation Ability/Technique  4. Decreased Balance INTERVENTIONS PLANNED:  1. Balance Exercise  2. Home Exercise Program (HEP)  3. Manual Therapy  4. Neuromuscular Re-education/Strengthening  5. Range of Motion (ROM)  6. Therapeutic Activites  7. Therapeutic Exercise/Strengthening   TREATMENT PLAN:  Effective Dates: 10/9/17 TO 1/1/18  Frequency/Duration: 1x a week for 12 weeks  GOALS: (Goals have been discussed and agreed upon with patient.)  Discharge Goals: Time Frame: 4 weeks  1. Patient demonstrates improved balance with ability to stand for 60 seconds single leg without loss of balance. - ONGOING  2. Patient able to to demonstrates improved pelvic mobility with gait, for 300' without verbal cueing or resistance from therapist. - ALMOST MET  3. Patient demonstrated ability to run 2 miles without onset of discomfort or loss of balance. - ONGOING  4. Patient able to perform balance exercises for 15 minutes with no more than 3 loss of balances. ALMOST MET  Rehabilitation Potential For Stated Goals: Excellent    Regarding Nel Bishop Forte's therapy, I certify that the treatment plan above will be carried out by a therapist or under their direction.   Thank you for this referral,  Panchito Wadsworth PT, DPT, CFMT    Referring Physician Signature: Zenobia Boo, * Date:                    The information in this section was collected on 7/19/17 (except where otherwise noted). HISTORY:   History of Present Injury/Illness (Reason for Referral):  Patient has history of a cavernous brainstem, history of stroke affecting her right side. She has a chronic history of intermittent low back pain and challenges with mobility into right foot/ankle. She is very motivated and performs daily exercises and strength training to improve her overall mobility and function. She has decided she wants to try out to join the [employer] and to date has passed 99% of her testing. :Unfortunately she was denied by the [employer] reserves, however she continues to want to improve her overall strength and balance. She has also expressed interest in obtaining a new AFO. Patient denies dizziness, drop attacks, numbness/tingling, bowel/bladder dysfunction and/or unexplained weight gain/loss. Past Medical History/Comorbidities:   Ms. Shayy Betts  has a past medical history of Stroke [facility]). She also has no past medical history of Abnormal glandular Papanicolaou smear of cervix; Acquired hypothyroidism; Adult physical abuse; Anemia NEC; Asthma; Chronic kidney disease, unspecified; Diabetes mellitus; Essential hypertension; Female infertility of unspecified origin; Heart abnormalities; Herpes simplex without mention of complication; Human immunodeficiency virus (HIV) disease ([facility] [address] OTHER MEDICAL; Liver disease; Phlebitis and thrombophlebitis of unspecified site; Postpartum depression; Psychiatric problem; Rhesus isoimmunization unspecified as to episode of care in pregnancy; Shock due to anesthesia not elsewhere classified; Sickle-cell disease, unspecified ([address] Systemic lupus erythematosus ([address] Unspecified breast disorder; Unspecified diseases of blood and blood-forming organs; or Unspecified epilepsy without mention of intractable epilepsy ([address]   Ms. Shayy Betts  has a past surgical history that includes neurological procedure unlisted. Social History/Living Environment:     Lives in a private home with her  and two children  Prior Level of Function/Work/Activity:  Patient is very active with daily workouts and running. She works full time with [employer] [address]. Dominant Side:         BILATERAL  Current Medications:       Current Outpatient Prescriptions:     ibuprofen (MOTRIN) 400 mg tablet, take 1 Tab by mouth every four (4) hours as needed for Pain., Disp: 30 Tab, Rfl: 1     Date Last Reviewed:  10/9/2017     Number of Personal Factors/Comorbidities that affect the Plan of Care: 1-2: MODERATE COMPLEXITY   EXAMINATION:   Observation/Orthostatic Postural Assessment:          Patient denies any LE pain, paresthesia or symptoms. Patient denies any increase of symptoms with cough, sneeze or valsalva. Patient denies any saddle paresthesia or bowel/bladder deficits. Observation/Orthostatic Postural Assessment: Patient exhibits a increased lumbar lordosis and convex right type I curve in mid-thoracic spine. Lower extremity weight bearing is slight increased left. Observation of gait indicates challenges with pelvic mobility  (patient is a pelvic/hip walker versus an efficient trunk walker). Lower quadrant bony landmarks are right elevated compared to left. Leg swing for innominate mobility indicates bilateral limitations into innominate extension. Vertical compression test (VCT) for alignment is 4. Elbow flexion test (EFT) for motor activation is 4. Soft tissue observation indicates restrictions noted in bilateral iliopsoas, hamstrings, right greater than left, piriformis right greater than left. improving  Palpation/Tissue, Texture, Tension, Tonal and Length Abnormalities: Myofascial assessment indicates restrictions into thoracolumbar fascia. Muscle length testing in modified Harley position exhibits restricted right greater than left improving .  Hamstring flexibility tested supine with straight leg raise (SLR) is restricted right greater than left. Piriformis length is restricted right greater than left improving. Quadriceps flexibility tested prone with heel to buttock is moderate restrictions right. Ely test is positive for rectus femoris tightness improving. Gastrocnemius and soleus flexibility are restricted right. Sacrotuberous ligament is right restrictions. Sacrococcygeal junction exhibits right rotated. Body of coccyx is flexed. improving  ROM: Passive trunk rotation is 90% available to the R and 90% available to the L. Kinetic testing in standing including forward bend test is positive left. Marcher's test is positive left. Pelvic shear test is standing exhibits decreased excursion of bilateral shear with a hard end feel. Single plane active movements through lumbar spine in standing exhibit WFL, tightness into hamstrings, right greater than left. improving Functional squat for LE clearance is WFL. AROM (PROM) Right Left   Hip flexion/Innominate flexion 90 90   Hip extension/Innominate extension 8 8   Hip external rotation (ER) 25 25   Hip internal rotation (IR) 20 20   Hip abduction 45 45   Strength: Lumbar protective mechanism (LPM) are present for initiation. LPM Strength */5   R anterior to posterior diagonal 3+   L anterior to posterior diagonal 3+   R posterior to anterior diagonal 4+   L posterior to anterior diagonal 4+     Manual Muscle Test (*/5) Right Left   Knee extension 5 5   Knee flexion 5 5   Hip flexion 4 5   Hip ER 4 5   Hip IR 4 5   Hip extension 4 4   Hip abduction 4 4   Hip adduction 5 5   Ankle DF 4- 5   Ankle PF 5 5   Core stability 4/5     Special Tests:  Tobi Mackintosh test is negative. Scours test is negative  Neurological Screen:  Myotomes: Key muscle strength testing through bilateral LE is intact. Dermatomes: Sensation testing through bilateral lower quadrants for light touch is intact. Reflexes: Patellar (L4) and Achilles (S1) are 2+ and WFL.    Neural tension tests: Passive straight leg raise (SLR) test is negative. Slump test is negative. Functional Mobility:  Overall demonstrates good mobility with functional tasks, challenged with balance in single limb stance  RLE: 15 seconds hold, LLE 40 seconds hold     Body Structures Involved:  1. Nerves  2. Joints  3. Muscles Body Functions Affected:  1. Sensory/Pain  2. Neuromusculoskeletal  3. Movement Related Activities and Participation Affected:  1. Mobility  2. Community, Social and [place] [employer]   Number of elements (examined above) that affect the Plan of Care: 4+: HIGH COMPLEXITY   CLINICAL PRESENTATION:   Presentation: Evolving clinical presentation with changing clinical characteristics: MODERATE COMPLEXITY   CLINICAL DECISION MAKING:   Outcome Measure: Tool Used: Modified Oswestry Low Back Pain Questionnaire  Score:  Initial: 3/50  Most Recent: 2/50 (Date: 10-9-17 )   Interpretation of Score: Each section is scored on a 0-5 scale, 5 representing the greatest disability. The scores of each section are added together for a total score of 50. Medical Necessity:   · Patient is expected to demonstrate progress in strength, range of motion, balance, coordination and functional technique to increase independence with ambulation and balance to prepare for her testing for the [facility] Reason for Services/Other Comments:  · Patient continues to require skilled intervention due to she would benefit from additional training in endurance of RLE and lumbar spine  and to improve overall mobility, stability and balance.  .   Use of outcome tool(s) and clinical judgement create a POC that gives a: Questionable prediction of patient's progress: MODERATE COMPLEXITY            TREATMENT:   (In addition to Assessment/Re-Assessment sessions the following treatments were rendered)  Pre-treatment Symptoms/Complaints:  Patient notes new AFO has helped with her walking, however notes some soreness in muscles secondary to new LE patterns. Pain: Initial:   Pain Intensity 1: 2  Pain Location 1: Buttocks, Spine, lumbar, Spine, sacral  Pain Orientation 1: Right  Post Session:  1/10     Therapeutic Exercise: (10 Minutes):  Exercises per grid below to improve mobility, strength, balance and coordination. Required minimal verbal and manual cues to promote proper body alignment, promote proper body posture, promote proper body mechanics and promote proper body breathing techniques. Progressed resistance, range, repetitions and complexity of movement as indicated. Date:  10/9/2017   Activity/Exercise Parameters   Balance exercises    Single limb stance     Quadriped posterior depression    ambulation    Review HEP X 5 minute review of HEP, self stretches, PNF techniques, lifting techniques, hip abduction and extension strengthening. Side lying left - right hip extension    Side lying for right anterior elevation and posterior depression of pelvis X 10 reps, 10 sec holds   Prone hip ER/IR strengthening X 10 reps, 5 sec holds   Side lying hip ER/IR strengthening X 10 reps, 5 sec holds       Manual Therapy (    Soft Tissue Mobilization Duration  Duration: 50 Minutes): Manual techniques to facilitate improved motion and decreased pain. · Supine right iliopsoas release with FMP of lower trunk rotation  · Supine right hamstrings, soft tissue mobilization right greater than left  · Supine right ankle subtalar distraction, manual mobilization into ankle DF with manual resistance  · With written consent received and precautions reviewed, instrument-assisted soft tissue mobilization was performed to right gluteus medius, minimus. Medial hamstrings and piriformis for the purpose of activation with a positive treatment effect and no complications noted.   · Side lying left for right prolonged holds into posterior depression and manual resistance into anterior elevation of pelvis, with hip flexion, hip adduction and ER and ankle DF    (Used abbreviations: MET - muscle energy technique; PNF - proprioceptive neuromuscular facilitation; NMR - neuromuscular re-education; a/p - anterior to posterior; p/a - posterior to anterior, FMP - functional movement patterns)    Treatment/Session Assessment:    · Response to Treatment: would benefit from continued physical therapy to improve overall mobility, function and improve new gait patterns with AFO. · Compliance with Program/Exercises: compliant all of the time. · Recommendations/Intent for next treatment session: \"Next visit will focus on advancements to more challenging activities\".   Total Treatment Duration:  PT Patient Time In/Time Out  Time In: 1430  Time Out: [address] PT

## 2017-10-11 ENCOUNTER — APPOINTMENT (OUTPATIENT)
Dept: PHYSICAL THERAPY | Age: 37
End: 2017-10-11
Payer: COMMERCIAL

## 2017-10-16 ENCOUNTER — HOSPITAL ENCOUNTER (OUTPATIENT)
Dept: PHYSICAL THERAPY | Age: 37
Discharge: HOME | End: 2017-10-16
Payer: COMMERCIAL

## 2017-10-16 PROCEDURE — 97140 MANUAL THERAPY 1/> REGIONS: CPT

## 2017-10-16 PROCEDURE — 97110 THERAPEUTIC EXERCISES: CPT

## 2017-10-17 NOTE — PROGRESS NOTES
Elly Forte  : [facility] [address]  [phone]   [phone]        OUTPATIENT PHYSICAL THERAPY:Daily Note 10/16/2017    ICD-10: Treatment Diagnosis: low back pain M54.5  ICD-10: Treatment Diagnosis: difficulty walking R26.2  ICD-10: Treatment Diagnosis: stiffness in joint; right foot M25.674  Precautions/Allergies:   Sulfa (sulfonamide antibiotics)   Fall Risk Score: 0 (? 5 = High Risk)  MD Orders: self referred MEDICAL/REFERRING DIAGNOSIS:  Foot pain [M79.673]   DATE OF ONSET: chronic  REFERRING PHYSICIAN: Alonso Dhaliwal MD  RETURN PHYSICIAN APPOINTMENT: as needed     RE-ASSESSMENT:  Ms. Cherylene Fontan presents to physical therapy with history of low back pain/right buttock pain and antalgic gait. She presents with limitations in RLE ROM, especially into her foot and ankle, with limited dorsiflexion. She has a history of a cavernous brainstem resulting in challenges with balance and gait, however patient is very motivated and demonstrates great strength through UE and LE. She would benefit from skilled physical therapy to improve her overall mobility, stability, balance and neuromuscular facilitation. 17: Patient has demonstrates improved mobility with gait and increasing strength and activation of core and gluteal muscles. She continues to benefit from tool assisted soft tissue techniques to improve overall mobility through her RLE. She has used an AFO twice in therapy sessions and notes significant improved gait and no loss of balance. She would benefit from an AFO for daily use to help with ambulation, stairs and driving. She would benefit from continued physical therapy to continues to improve pelvic mobility and improve motor control through her pelvis and LE.   10-9-17: Wil Saenz has continued to improve overall mobility through LE.  She is ambulating better with her new AFO however notes increased soreness in RLE musculature secondary to new LE patterns. She continues to have low back pain and right buttocks pain. She would benefit from continued physical therapy to improve overall mobility, stability and endurance through LE, pelvis and lumbar spine. PROBLEM LIST (Impacting functional limitations):  1. Decreased Strength  2. Decreased ADL/Functional Activities  3. Decreased Ambulation Ability/Technique  4. Decreased Balance INTERVENTIONS PLANNED:  1. Balance Exercise  2. Home Exercise Program (HEP)  3. Manual Therapy  4. Neuromuscular Re-education/Strengthening  5. Range of Motion (ROM)  6. Therapeutic Activites  7. Therapeutic Exercise/Strengthening   TREATMENT PLAN:  Effective Dates: 10/9/17 TO 1/1/18  Frequency/Duration: 1x a week for 12 weeks  GOALS: (Goals have been discussed and agreed upon with patient.)  Discharge Goals: Time Frame: 4 weeks  1. Patient demonstrates improved balance with ability to stand for 60 seconds single leg without loss of balance. - ONGOING  2. Patient able to to demonstrates improved pelvic mobility with gait, for 300' without verbal cueing or resistance from therapist. - ALMOST MET  3. Patient demonstrated ability to run 2 miles without onset of discomfort or loss of balance. - ONGOING  4. Patient able to perform balance exercises for 15 minutes with no more than 3 loss of balances. ALMOST MET  Rehabilitation Potential For Stated Goals: Excellent                    The information in this section was collected on 7/19/17 (except where otherwise noted). HISTORY:   History of Present Injury/Illness (Reason for Referral):  Patient has history of a cavernous brainstem, history of stroke affecting her right side. She has a chronic history of intermittent low back pain and challenges with mobility into right foot/ankle. She is very motivated and performs daily exercises and strength training to improve her overall mobility and function.  She has decided she wants to try out to join the [employer] and to date has passed 99% of her testing. :Unfortunately she was denied by the the [id number] reserves, however she continues to want to improve her overall strength and balance. She has also expressed interest in obtaining a new AFO. Patient denies dizziness, drop attacks, numbness/tingling, bowel/bladder dysfunction and/or unexplained weight gain/loss. Past Medical History/Comorbidities:   Ms. Grabiel Owen  has a past medical history of Stroke [facility]). She also has no past medical history of Abnormal glandular Papanicolaou smear of cervix; Acquired hypothyroidism; Adult physical abuse; Anemia NEC; Asthma; Chronic kidney disease, unspecified; Diabetes mellitus; Essential hypertension; Female infertility of unspecified origin; Heart abnormalities; Herpes simplex without mention of complication; Human immunodeficiency virus (HIV) disease ([facility] OTHER MEDICAL; Liver disease; Phlebitis and thrombophlebitis of unspecified site; Postpartum depression; Psychiatric problem; Rhesus isoimmunization unspecified as to episode of care in pregnancy; Shock due to anesthesia not elsewhere classified; Sickle-cell disease, unspecified ([facility] Systemic lupus erythematosus ([address] Unspecified breast disorder; Unspecified diseases of blood and blood-forming organs; or Unspecified epilepsy without mention of intractable epilepsy ([facility] Ms. Grabiel Owen  has a past surgical history that includes neurological procedure unlisted. Social History/Living Environment:     Lives in a private home with her  and two children  Prior Level of Function/Work/Activity:  Patient is very active with daily workouts and running. She works full time with [employer].   Dominant Side:         BILATERAL  Current Medications:       Current Outpatient Prescriptions:     ibuprofen (MOTRIN) 400 mg tablet, take 1 Tab by mouth every four (4) hours as needed for Pain., Disp: 30 Tab, Rfl: 1     Date Last Reviewed:  10/16/2017 Number of Personal Factors/Comorbidities that affect the Plan of Care: 1-2: MODERATE COMPLEXITY   EXAMINATION:   Observation/Orthostatic Postural Assessment:          Patient denies any LE pain, paresthesia or symptoms. Patient denies any increase of symptoms with cough, sneeze or valsalva. Patient denies any saddle paresthesia or bowel/bladder deficits. Observation/Orthostatic Postural Assessment: Patient exhibits a increased lumbar lordosis and convex right type I curve in mid-thoracic spine. Lower extremity weight bearing is slight increased left. Observation of gait indicates challenges with pelvic mobility  (patient is a pelvic/hip walker versus an efficient trunk walker). Lower quadrant bony landmarks are right elevated compared to left. Leg swing for innominate mobility indicates bilateral limitations into innominate extension. Vertical compression test (VCT) for alignment is 4. Elbow flexion test (EFT) for motor activation is 4. Soft tissue observation indicates restrictions noted in bilateral iliopsoas, hamstrings, right greater than left, piriformis right greater than left. improving  Palpation/Tissue, Texture, Tension, Tonal and Length Abnormalities: Myofascial assessment indicates restrictions into thoracolumbar fascia. Muscle length testing in modified Harley position exhibits restricted right greater than left improving . Hamstring flexibility tested supine with straight leg raise (SLR) is restricted right greater than left. Piriformis length is restricted right greater than left improving. Quadriceps flexibility tested prone with heel to buttock is moderate restrictions right. Ely test is positive for rectus femoris tightness improving. Gastrocnemius and soleus flexibility are restricted right. Sacrotuberous ligament is right restrictions. Sacrococcygeal junction exhibits right rotated. Body of coccyx is flexed.   improving  ROM: Passive trunk rotation is 90% available to the R and 90% available to the L. Kinetic testing in standing including forward bend test is positive left. Marcher's test is positive left. Pelvic shear test is standing exhibits decreased excursion of bilateral shear with a hard end feel. Single plane active movements through lumbar spine in standing exhibit WFL, tightness into hamstrings, right greater than left. improving Functional squat for LE clearance is WFL. AROM (PROM) Right Left   Hip flexion/Innominate flexion 90 90   Hip extension/Innominate extension 8 8   Hip external rotation (ER) 25 25   Hip internal rotation (IR) 20 20   Hip abduction 45 45   Strength: Lumbar protective mechanism (LPM) are present for initiation. LPM Strength */5   R anterior to posterior diagonal 3+   L anterior to posterior diagonal 3+   R posterior to anterior diagonal 4+   L posterior to anterior diagonal 4+     Manual Muscle Test (*/5) Right Left   Knee extension 5 5   Knee flexion 5 5   Hip flexion 4 5   Hip ER 4 5   Hip IR 4 5   Hip extension 4 4   Hip abduction 4 4   Hip adduction 5 5   Ankle DF 4- 5   Ankle PF 5 5   Core stability 4/5     Special Tests:  Bernard [place] test is negative. Scours test is negative  Neurological Screen:  Myotomes: Key muscle strength testing through bilateral LE is intact. Dermatomes: Sensation testing through bilateral lower quadrants for light touch is intact. Reflexes: Patellar (L4) and Achilles (S1) are 2+ and WFL. Neural tension tests: Passive straight leg raise (SLR) test is negative. Slump test is negative. Functional Mobility:  Overall demonstrates good mobility with functional tasks, challenged with balance in single limb stance  RLE: 15 seconds hold, LLE 40 seconds hold     Body Structures Involved:  1. Nerves  2. Joints  3. Muscles Body Functions Affected:  1. Sensory/Pain  2. Neuromusculoskeletal  3. Movement Related Activities and Participation Affected:  1. Mobility  2.  Community, Social and [place] [place]   Number of elements (examined above) that affect the Plan of Care: 4+: HIGH COMPLEXITY   CLINICAL PRESENTATION:   Presentation: Evolving clinical presentation with changing clinical characteristics: MODERATE COMPLEXITY   CLINICAL DECISION MAKING:   Outcome Measure: Tool Used: Modified Oswestry Low Back Pain Questionnaire  Score:  Initial: 3/50  Most Recent: 2/50 (Date: 10-9-17 )   Interpretation of Score: Each section is scored on a 0-5 scale, 5 representing the greatest disability. The scores of each section are added together for a total score of 50. Medical Necessity:   · Patient is expected to demonstrate progress in strength, range of motion, balance, coordination and functional technique to increase independence with ambulation and balance to prepare for her testing for the [facility] Reason for Services/Other Comments:  · Patient continues to require skilled intervention due to she would benefit from additional training in endurance of RLE and lumbar spine  and to improve overall mobility, stability and balance. .   Use of outcome tool(s) and clinical judgement create a POC that gives a: Questionable prediction of patient's progress: MODERATE COMPLEXITY            TREATMENT:   (In addition to Assessment/Re-Assessment sessions the following treatments were rendered)  Pre-treatment Symptoms/Complaints:  Patient notes improved ambulation after last session, also improving use of her AFO with her daily walks. Pain: Initial:   Pain Intensity 1: 2  Pain Location 1: Spine, lumbar  Pain Orientation 1: Posterior  Post Session:  1/10     Therapeutic Exercise: (10 Minutes):  Exercises per grid below to improve mobility, strength, balance and coordination. Required minimal verbal and manual cues to promote proper body alignment, promote proper body posture, promote proper body mechanics and promote proper body breathing techniques. Progressed resistance, range, repetitions and complexity of movement as indicated.    Date:  10/16/2017 Activity/Exercise Parameters   Balance exercises    Single limb stance     Quadriped posterior depression    ambulation    Review HEP X 5 minute review of HEP, self stretches, PNF techniques, lifting techniques, hip abduction and extension strengthening. Side lying left - right hip extension    Side lying for right anterior elevation and posterior depression of pelvis X 10 reps, 10 sec holds   Prone hip ER/IR strengthening X 10 reps, 5 sec holds   Side lying hip ER/IR strengthening X 10 reps, 5 sec holds   Abdominal bracing X 10 reps 10 sec holds 90/90 holds       Manual Therapy (    Soft Tissue Mobilization Duration  Duration: 35 Minutes): Manual techniques to facilitate improved motion and decreased pain. · Supine right iliopsoas release with FMP of lower trunk rotation  · Supine right hamstrings, soft tissue mobilization right greater than left  · Supine right ankle subtalar distraction, manual mobilization into ankle DF with manual resistance  · With written consent received and precautions reviewed, instrument-assisted soft tissue mobilization was performed to right gluteus medius, minimus, anterior tibialis, Medial hamstrings and piriformis for the purpose of activation with a positive treatment effect and no complications noted. · Side lying left for right prolonged holds into posterior depression and manual resistance into anterior elevation of pelvis, with hip flexion, hip adduction and ER and ankle DF    (Used abbreviations: MET - muscle energy technique; PNF - proprioceptive neuromuscular facilitation; NMR - neuromuscular re-education; a/p - anterior to posterior; p/a - posterior to anterior, FMP - functional movement patterns)    Treatment/Session Assessment:    · Response to Treatment: improved activation of RLE musculature, improved ambulation at end of session with improved ankle DF and clearance of LE. · Compliance with Program/Exercises: compliant all of the time.   · Recommendations/Intent for next treatment session: \"Next visit will focus on advancements to more challenging activities\".   Total Treatment Duration:  PT Patient Time In/Time Out  Time In: 1445  Time Out: [address] Kristen Peacock PT

## 2017-10-24 ENCOUNTER — APPOINTMENT (OUTPATIENT)
Dept: PHYSICAL THERAPY | Age: 37
End: 2017-10-24
Payer: COMMERCIAL

## 2017-10-26 ENCOUNTER — HOSPITAL ENCOUNTER (OUTPATIENT)
Dept: PHYSICAL THERAPY | Age: 37
Discharge: HOME | End: 2017-10-26
Payer: COMMERCIAL

## 2017-10-26 PROCEDURE — 97140 MANUAL THERAPY 1/> REGIONS: CPT

## 2017-10-26 NOTE — PROGRESS NOTES
Alena Forte  : [facility] [address]  [phone]   [phone]        OUTPATIENT PHYSICAL THERAPY:Daily Note 10/26/2017    ICD-10: Treatment Diagnosis: low back pain M54.5  ICD-10: Treatment Diagnosis: difficulty walking R26.2  ICD-10: Treatment Diagnosis: stiffness in joint; right foot M25.674  Precautions/Allergies:   Sulfa (sulfonamide antibiotics)   Fall Risk Score: 0 (? 5 = High Risk)  MD Orders: self referred MEDICAL/REFERRING DIAGNOSIS:  Foot pain [M79.673]   DATE OF ONSET: chronic  REFERRING PHYSICIAN: Thomas Pringle MD  RETURN PHYSICIAN APPOINTMENT: as needed     RE-ASSESSMENT:  Ms. Martha Becerra presents to physical therapy with history of low back pain/right buttock pain and antalgic gait. She presents with limitations in RLE ROM, especially into her foot and ankle, with limited dorsiflexion. She has a history of a cavernous brainstem resulting in challenges with balance and gait, however patient is very motivated and demonstrates great strength through UE and LE. She would benefit from skilled physical therapy to improve her overall mobility, stability, balance and neuromuscular facilitation. 17: Patient has demonstrates improved mobility with gait and increasing strength and activation of core and gluteal muscles. She continues to benefit from tool assisted soft tissue techniques to improve overall mobility through her RLE. She has used an AFO twice in therapy sessions and notes significant improved gait and no loss of balance. She would benefit from an AFO for daily use to help with ambulation, stairs and driving. She would benefit from continued physical therapy to continues to improve pelvic mobility and improve motor control through her pelvis and LE.   10-9-17: Ginger Naranjo has continued to improve overall mobility through LE.  She is ambulating better with her new AFO however notes increased soreness in RLE musculature secondary to new LE patterns. She continues to have low back pain and right buttocks pain. She would benefit from continued physical therapy to improve overall mobility, stability and endurance through LE, pelvis and lumbar spine. PROBLEM LIST (Impacting functional limitations):  1. Decreased Strength  2. Decreased ADL/Functional Activities  3. Decreased Ambulation Ability/Technique  4. Decreased Balance INTERVENTIONS PLANNED:  1. Balance Exercise  2. Home Exercise Program (HEP)  3. Manual Therapy  4. Neuromuscular Re-education/Strengthening  5. Range of Motion (ROM)  6. Therapeutic Activites  7. Therapeutic Exercise/Strengthening   TREATMENT PLAN:  Effective Dates: 10/9/17 TO 1/1/18  Frequency/Duration: 1x a week for 12 weeks  GOALS: (Goals have been discussed and agreed upon with patient.)  Discharge Goals: Time Frame: 4 weeks  1. Patient demonstrates improved balance with ability to stand for 60 seconds single leg without loss of balance. - ONGOING  2. Patient able to to demonstrates improved pelvic mobility with gait, for 300' without verbal cueing or resistance from therapist. - ALMOST MET  3. Patient demonstrated ability to run 2 miles without onset of discomfort or loss of balance. - ONGOING  4. Patient able to perform balance exercises for 15 minutes with no more than 3 loss of balances. ALMOST MET  Rehabilitation Potential For Stated Goals: Excellent                    The information in this section was collected on 7/19/17 (except where otherwise noted). HISTORY:   History of Present Injury/Illness (Reason for Referral):  Patient has history of a cavernous brainstem, history of stroke affecting her right side. She has a chronic history of intermittent low back pain and challenges with mobility into right foot/ankle. She is very motivated and performs daily exercises and strength training to improve her overall mobility and function.  She has decided she wants to try out to join the [employer] and to date has passed 99% of her testing. :Unfortunately she was denied by the the [employer], however she continues to want to improve her overall strength and balance. She has also expressed interest in obtaining a new AFO. Patient denies dizziness, drop attacks, numbness/tingling, bowel/bladder dysfunction and/or unexplained weight gain/loss. Past Medical History/Comorbidities:   Ms. Mustapha Echavarria  has a past medical history of Stroke [facility]). She also has no past medical history of Abnormal glandular Papanicolaou smear of cervix; Acquired hypothyroidism; Adult physical abuse; Anemia NEC; Asthma; Chronic kidney disease, unspecified; Diabetes mellitus; Essential hypertension; Female infertility of unspecified origin; Heart abnormalities; Herpes simplex without mention of complication; Human immunodeficiency virus (HIV) disease ([facility] OTHER MEDICAL; Liver disease; Phlebitis and thrombophlebitis of unspecified site; Postpartum depression; Psychiatric problem; Rhesus isoimmunization unspecified as to episode of care in pregnancy; Shock due to anesthesia not elsewhere classified; Sickle-cell disease, unspecified ([address] Systemic lupus erythematosus ([address] Unspecified breast disorder; Unspecified diseases of blood and blood-forming organs; or Unspecified epilepsy without mention of intractable epilepsy ([facility] [address] Ms. Mustapha Echavarria  has a past surgical history that includes neurological procedure unlisted. Social History/Living Environment:     Lives in a private home with her  and two children  Prior Level of Function/Work/Activity:  Patient is very active with daily workouts and running. She works full time with [employer].   Dominant Side:         BILATERAL  Current Medications:       Current Outpatient Prescriptions:     ibuprofen (MOTRIN) 400 mg tablet, take 1 Tab by mouth every four (4) hours as needed for Pain., Disp: 30 Tab, Rfl: 1     Date Last Reviewed:  10/26/2017 Number of Personal Factors/Comorbidities that affect the Plan of Care: 1-2: MODERATE COMPLEXITY   EXAMINATION:   Observation/Orthostatic Postural Assessment:          Patient denies any LE pain, paresthesia or symptoms. Patient denies any increase of symptoms with cough, sneeze or valsalva. Patient denies any saddle paresthesia or bowel/bladder deficits. Observation/Orthostatic Postural Assessment: Patient exhibits a increased lumbar lordosis and convex right type I curve in mid-thoracic spine. Lower extremity weight bearing is slight increased left. Observation of gait indicates challenges with pelvic mobility  (patient is a pelvic/hip walker versus an efficient trunk walker). Lower quadrant bony landmarks are right elevated compared to left. Leg swing for innominate mobility indicates bilateral limitations into innominate extension. Vertical compression test (VCT) for alignment is 4. Elbow flexion test (EFT) for motor activation is 4. Soft tissue observation indicates restrictions noted in bilateral iliopsoas, hamstrings, right greater than left, piriformis right greater than left. improving  Palpation/Tissue, Texture, Tension, Tonal and Length Abnormalities: Myofascial assessment indicates restrictions into thoracolumbar fascia. Muscle length testing in modified Harley position exhibits restricted right greater than left improving . Hamstring flexibility tested supine with straight leg raise (SLR) is restricted right greater than left. Piriformis length is restricted right greater than left improving. Quadriceps flexibility tested prone with heel to buttock is moderate restrictions right. Ely test is positive for rectus femoris tightness improving. Gastrocnemius and soleus flexibility are restricted right. Sacrotuberous ligament is right restrictions. Sacrococcygeal junction exhibits right rotated. Body of coccyx is flexed.   improving  ROM: Passive trunk rotation is 90% available to the R and 90% available to the L. Kinetic testing in standing including forward bend test is positive left. Marcher's test is positive left. Pelvic shear test is standing exhibits decreased excursion of bilateral shear with a hard end feel. Single plane active movements through lumbar spine in standing exhibit WFL, tightness into hamstrings, right greater than left. improving Functional squat for LE clearance is WFL. AROM (PROM) Right Left   Hip flexion/Innominate flexion 90 90   Hip extension/Innominate extension 8 8   Hip external rotation (ER) 25 25   Hip internal rotation (IR) 20 20   Hip abduction 45 45   Strength: Lumbar protective mechanism (LPM) are present for initiation. LPM Strength */5   R anterior to posterior diagonal 3+   L anterior to posterior diagonal 3+   R posterior to anterior diagonal 4+   L posterior to anterior diagonal 4+     Manual Muscle Test (*/5) Right Left   Knee extension 5 5   Knee flexion 5 5   Hip flexion 4 5   Hip ER 4 5   Hip IR 4 5   Hip extension 4 4   Hip abduction 4 4   Hip adduction 5 5   Ankle DF 4- 5   Ankle PF 5 5   Core stability 4/5     Special Tests:  Alvin Grieve test is negative. Scours test is negative  Neurological Screen:  Myotomes: Key muscle strength testing through bilateral LE is intact. Dermatomes: Sensation testing through bilateral lower quadrants for light touch is intact. Reflexes: Patellar (L4) and Achilles (S1) are 2+ and WFL. Neural tension tests: Passive straight leg raise (SLR) test is negative. Slump test is negative. Functional Mobility:  Overall demonstrates good mobility with functional tasks, challenged with balance in single limb stance  RLE: 15 seconds hold, LLE 40 seconds hold     Body Structures Involved:  1. Nerves  2. Joints  3. Muscles Body Functions Affected:  1. Sensory/Pain  2. Neuromusculoskeletal  3. Movement Related Activities and Participation Affected:  1. Mobility  2.  Community, Social and Gregory [place]   Number of elements (examined above) that affect the Plan of Care: 4+: HIGH COMPLEXITY   CLINICAL PRESENTATION:   Presentation: Evolving clinical presentation with changing clinical characteristics: MODERATE COMPLEXITY   CLINICAL DECISION MAKING:   Outcome Measure: Tool Used: Modified Oswestry Low Back Pain Questionnaire  Score:  Initial: 3/50  Most Recent: 2/50 (Date: 10-9-17 )   Interpretation of Score: Each section is scored on a 0-5 scale, 5 representing the greatest disability. The scores of each section are added together for a total score of 50. Medical Necessity:   · Patient is expected to demonstrate progress in strength, range of motion, balance, coordination and functional technique to increase independence with ambulation and balance to prepare for her testing for the [facility] Reason for Services/Other Comments:  · Patient continues to require skilled intervention due to she would benefit from additional training in endurance of RLE and lumbar spine  and to improve overall mobility, stability and balance. .   Use of outcome tool(s) and clinical judgement create a POC that gives a: Questionable prediction of patient's progress: MODERATE COMPLEXITY            TREATMENT:   (In addition to Assessment/Re-Assessment sessions the following treatments were rendered)  Pre-treatment Symptoms/Complaints:  Patient notes went for a hike this weekend and wore her AFO, felt great during the hike, however that night she had challenges with bending her right hip and has continued to be very tender along her groin. Pain: Initial:   Pain Intensity 1: 5  Pain Location 1: Hip  Pain Orientation 1: Anterior, Right  Post Session:  4/10     Therapeutic Exercise: ( ):  Exercises per grid below to improve mobility, strength, balance and coordination. Required minimal verbal and manual cues to promote proper body alignment, promote proper body posture, promote proper body mechanics and promote proper body breathing techniques.   Progressed resistance, range, repetitions and complexity of movement as indicated. Date:  10/26/2017   Activity/Exercise Parameters   Balance exercises    Single limb stance     Quadriped posterior depression    ambulation    Review HEP    Side lying left - right hip extension    Side lying for right anterior elevation and posterior depression of pelvis    Prone hip ER/IR strengthening    Side lying hip ER/IR strengthening    Abdominal bracing        Manual Therapy (    Soft Tissue Mobilization Duration  Duration: 30 Minutes): Manual techniques to facilitate improved motion and decreased pain. · Supine right iliopsoas release with FMP of lower trunk rotation  · Supine strumming of iliopsoas along inguinal ligament, with FMP of knee extension. · Supine right hamstrings, soft tissue mobilization right greater than left  · Supine right ankle subtalar distraction, manual mobilization into ankle DF with manual resistance  · With written consent received and precautions reviewed, instrument-assisted soft tissue mobilization was performed to right gluteus medius, minimus, anterior tibialis and piriformis for the purpose of activation with a positive treatment effect and no complications noted. · Side lying left for right prolonged holds into posterior depression and manual resistance into anterior elevation of pelvis, with hip flexion, hip adduction and ER and ankle DF    (Used abbreviations: MET - muscle energy technique; PNF - proprioceptive neuromuscular facilitation; NMR - neuromuscular re-education; a/p - anterior to posterior; p/a - posterior to anterior, FMP - functional movement patterns)    Treatment/Session Assessment:    · Response to Treatment: decreasing soft tissue restrictions noted in right anterior hip/pelvis, improved gait at end of session. · Compliance with Program/Exercises: compliant all of the time. · Recommendations/Intent for next treatment session:  \"Next visit will focus on advancements to more challenging activities\".   Total Treatment Duration:  PT Patient Time In/Time Out  Time In: 0800  Time Out: 0830    Eduardo Rios PT

## 2017-10-31 ENCOUNTER — HOSPITAL ENCOUNTER (OUTPATIENT)
Dept: PHYSICAL THERAPY | Age: 37
Discharge: HOME | End: 2017-10-31
Payer: COMMERCIAL

## 2017-10-31 PROCEDURE — 97140 MANUAL THERAPY 1/> REGIONS: CPT

## 2017-11-01 NOTE — PROGRESS NOTES
Barbara Forte  : [facility] [address]  [phone]   [phone]        OUTPATIENT PHYSICAL THERAPY:Daily Note 10/31/2017    ICD-10: Treatment Diagnosis: low back pain M54.5  ICD-10: Treatment Diagnosis: difficulty walking R26.2  ICD-10: Treatment Diagnosis: stiffness in joint; right foot M25.674  Precautions/Allergies:   Sulfa (sulfonamide antibiotics)   Fall Risk Score: 0 (? 5 = High Risk)  MD Orders: self referred MEDICAL/REFERRING DIAGNOSIS:  Foot pain [M79.673]   DATE OF ONSET: chronic  REFERRING PHYSICIAN: Guerita Elmore MD  RETURN PHYSICIAN APPOINTMENT: as needed     RE-ASSESSMENT:  Ms. Nelson Zambrano presents to physical therapy with history of low back pain/right buttock pain and antalgic gait. She presents with limitations in RLE ROM, especially into her foot and ankle, with limited dorsiflexion. She has a history of a cavernous brainstem resulting in challenges with balance and gait, however patient is very motivated and demonstrates great strength through UE and LE. She would benefit from skilled physical therapy to improve her overall mobility, stability, balance and neuromuscular facilitation. 17: Patient has demonstrates improved mobility with gait and increasing strength and activation of core and gluteal muscles. She continues to benefit from tool assisted soft tissue techniques to improve overall mobility through her RLE. She has used an AFO twice in therapy sessions and notes significant improved gait and no loss of balance. She would benefit from an AFO for daily use to help with ambulation, stairs and driving. She would benefit from continued physical therapy to continues to improve pelvic mobility and improve motor control through her pelvis and LE.   10-9-17: Eric Maravilla has continued to improve overall mobility through LE.  She is ambulating better with her new AFO however notes increased soreness in RLE musculature secondary to new LE patterns. She continues to have low back pain and right buttocks pain. She would benefit from continued physical therapy to improve overall mobility, stability and endurance through LE, pelvis and lumbar spine. PROBLEM LIST (Impacting functional limitations):  1. Decreased Strength  2. Decreased ADL/Functional Activities  3. Decreased Ambulation Ability/Technique  4. Decreased Balance INTERVENTIONS PLANNED:  1. Balance Exercise  2. Home Exercise Program (HEP)  3. Manual Therapy  4. Neuromuscular Re-education/Strengthening  5. Range of Motion (ROM)  6. Therapeutic Activites  7. Therapeutic Exercise/Strengthening   TREATMENT PLAN:  Effective Dates: 10/9/17 TO 1/1/18  Frequency/Duration: 1x a week for 12 weeks  GOALS: (Goals have been discussed and agreed upon with patient.)  Discharge Goals: Time Frame: 4 weeks  1. Patient demonstrates improved balance with ability to stand for 60 seconds single leg without loss of balance. - ONGOING  2. Patient able to to demonstrates improved pelvic mobility with gait, for 300' without verbal cueing or resistance from therapist. - ALMOST MET  3. Patient demonstrated ability to run 2 miles without onset of discomfort or loss of balance. - ONGOING  4. Patient able to perform balance exercises for 15 minutes with no more than 3 loss of balances. ALMOST MET  Rehabilitation Potential For Stated Goals: Excellent                    The information in this section was collected on 7/19/17 (except where otherwise noted). HISTORY:   History of Present Injury/Illness (Reason for Referral):  Patient has history of a cavernous brainstem, history of stroke affecting her right side. She has a chronic history of intermittent low back pain and challenges with mobility into right foot/ankle. She is very motivated and performs daily exercises and strength training to improve her overall mobility and function.  She has decided she wants to try out to join the [employer] and to date has passed 99% of her testing. :Unfortunately she was denied by the the [employer] reserves, however she continues to want to improve her overall strength and balance. She has also expressed interest in obtaining a new AFO. Patient denies dizziness, drop attacks, numbness/tingling, bowel/bladder dysfunction and/or unexplained weight gain/loss. Past Medical History/Comorbidities:   Ms. Raman Mueller  has a past medical history of Stroke [facility]). She also has no past medical history of Abnormal glandular Papanicolaou smear of cervix; Acquired hypothyroidism; Adult physical abuse; Anemia NEC; Asthma; Chronic kidney disease, unspecified; Diabetes mellitus; Essential hypertension; Female infertility of unspecified origin; Heart abnormalities; Herpes simplex without mention of complication; Human immunodeficiency virus (HIV) disease ([facility] [address] OTHER MEDICAL; Liver disease; Phlebitis and thrombophlebitis of unspecified site; Postpartum depression; Psychiatric problem; Rhesus isoimmunization unspecified as to episode of care in pregnancy; Shock due to anesthesia not elsewhere classified; Sickle-cell disease, unspecified ([address] Systemic lupus erythematosus ([address] Unspecified breast disorder; Unspecified diseases of blood and blood-forming organs; or Unspecified epilepsy without mention of intractable epilepsy ([facility] [address] Ms. Raman Mueller  has a past surgical history that includes neurological procedure unlisted. Social History/Living Environment:     Lives in a private home with her  and two children  Prior Level of Function/Work/Activity:  Patient is very active with daily workouts and running. She works full time with [employer].   Dominant Side:         BILATERAL  Current Medications:       Current Outpatient Prescriptions:     ibuprofen (MOTRIN) 400 mg tablet, take 1 Tab by mouth every four (4) hours as needed for Pain., Disp: 30 Tab, Rfl: 1     Date Last Reviewed:  10/31/2017 Number of Personal Factors/Comorbidities that affect the Plan of Care: 1-2: MODERATE COMPLEXITY   EXAMINATION:   Observation/Orthostatic Postural Assessment:          Patient denies any LE pain, paresthesia or symptoms. Patient denies any increase of symptoms with cough, sneeze or valsalva. Patient denies any saddle paresthesia or bowel/bladder deficits. Observation/Orthostatic Postural Assessment: Patient exhibits a increased lumbar lordosis and convex right type I curve in mid-thoracic spine. Lower extremity weight bearing is slight increased left. Observation of gait indicates challenges with pelvic mobility  (patient is a pelvic/hip walker versus an efficient trunk walker). Lower quadrant bony landmarks are right elevated compared to left. Leg swing for innominate mobility indicates bilateral limitations into innominate extension. Vertical compression test (VCT) for alignment is 4. Elbow flexion test (EFT) for motor activation is 4. Soft tissue observation indicates restrictions noted in bilateral iliopsoas, hamstrings, right greater than left, piriformis right greater than left. improving  Palpation/Tissue, Texture, Tension, Tonal and Length Abnormalities: Myofascial assessment indicates restrictions into thoracolumbar fascia. Muscle length testing in modified Harley position exhibits restricted right greater than left improving . Hamstring flexibility tested supine with straight leg raise (SLR) is restricted right greater than left. Piriformis length is restricted right greater than left improving. Quadriceps flexibility tested prone with heel to buttock is moderate restrictions right. Ely test is positive for rectus femoris tightness improving. Gastrocnemius and soleus flexibility are restricted right. Sacrotuberous ligament is right restrictions. Sacrococcygeal junction exhibits right rotated. Body of coccyx is flexed.   improving  ROM: Passive trunk rotation is 90% available to the R and 90% available to the L. Kinetic testing in standing including forward bend test is positive left. Marcher's test is positive left. Pelvic shear test is standing exhibits decreased excursion of bilateral shear with a hard end feel. Single plane active movements through lumbar spine in standing exhibit WFL, tightness into hamstrings, right greater than left. improving Functional squat for LE clearance is WFL. AROM (PROM) Right Left   Hip flexion/Innominate flexion 90 90   Hip extension/Innominate extension 8 8   Hip external rotation (ER) 25 25   Hip internal rotation (IR) 20 20   Hip abduction 45 45   Strength: Lumbar protective mechanism (LPM) are present for initiation. LPM Strength */5   R anterior to posterior diagonal 3+   L anterior to posterior diagonal 3+   R posterior to anterior diagonal 4+   L posterior to anterior diagonal 4+     Manual Muscle Test (*/5) Right Left   Knee extension 5 5   Knee flexion 5 5   Hip flexion 4 5   Hip ER 4 5   Hip IR 4 5   Hip extension 4 4   Hip abduction 4 4   Hip adduction 5 5   Ankle DF 4- 5   Ankle PF 5 5   Core stability 4/5     Special Tests:  Tobi Mackintosh test is negative. Scours test is negative  Neurological Screen:  Myotomes: Key muscle strength testing through bilateral LE is intact. Dermatomes: Sensation testing through bilateral lower quadrants for light touch is intact. Reflexes: Patellar (L4) and Achilles (S1) are 2+ and WFL. Neural tension tests: Passive straight leg raise (SLR) test is negative. Slump test is negative. Functional Mobility:  Overall demonstrates good mobility with functional tasks, challenged with balance in single limb stance  RLE: 15 seconds hold, LLE 40 seconds hold     Body Structures Involved:  1. Nerves  2. Joints  3. Muscles Body Functions Affected:  1. Sensory/Pain  2. Neuromusculoskeletal  3. Movement Related Activities and Participation Affected:  1. Mobility  2.  Community, Social and [place]   Number of elements (examined above) that affect the Plan of Care: 4+: HIGH COMPLEXITY   CLINICAL PRESENTATION:   Presentation: Evolving clinical presentation with changing clinical characteristics: MODERATE COMPLEXITY   CLINICAL DECISION MAKING:   Outcome Measure: Tool Used: Modified Oswestry Low Back Pain Questionnaire  Score:  Initial: 3/50  Most Recent: 2/50 (Date: 10-9-17 )   Interpretation of Score: Each section is scored on a 0-5 scale, 5 representing the greatest disability. The scores of each section are added together for a total score of 50. Medical Necessity:   · Patient is expected to demonstrate progress in strength, range of motion, balance, coordination and functional technique to increase independence with ambulation and balance to prepare for her testing for the [facility] Reason for Services/Other Comments:  · Patient continues to require skilled intervention due to she would benefit from additional training in endurance of RLE and lumbar spine  and to improve overall mobility, stability and balance. .   Use of outcome tool(s) and clinical judgement create a POC that gives a: Questionable prediction of patient's progress: MODERATE COMPLEXITY            TREATMENT:   (In addition to Assessment/Re-Assessment sessions the following treatments were rendered)  Pre-treatment Symptoms/Complaints:  Patient notes less discomfort noted in right anterior hip   Pain: Initial:   Pain Intensity 1: 4  Pain Location 1: Hip  Pain Orientation 1: Right  Post Session:  4/10     Therapeutic Exercise: (5 Minutes):  Exercises per grid below to improve mobility, strength, balance and coordination. Required minimal verbal and manual cues to promote proper body alignment, promote proper body posture, promote proper body mechanics and promote proper body breathing techniques. Progressed resistance, range, repetitions and complexity of movement as indicated.    Date:  10/31/2017   Activity/Exercise Parameters   Balance exercises    Single limb stance Quadriped posterior depression    ambulation    Review HEP X 5 minutes review   Side lying left - right hip extension    Side lying for right anterior elevation and posterior depression of pelvis    Prone hip ER/IR strengthening    Side lying hip ER/IR strengthening    Abdominal bracing        Manual Therapy (    Soft Tissue Mobilization Duration  Duration: 25 Minutes): Manual techniques to facilitate improved motion and decreased pain. · Supine right iliopsoas release with FMP of lower trunk rotation  · Supine strumming of iliopsoas along inguinal ligament, with FMP of knee extension. · Supine right hamstrings, soft tissue mobilization right greater than left  · Supine right ankle subtalar distraction, manual mobilization into ankle DF with manual resistance  · With written consent received and precautions reviewed, instrument-assisted soft tissue mobilization was performed to right anterior tibialis and hamstrings for the purpose of activation with a positive treatment effect and no complications noted. · Side lying left for right prolonged holds into posterior depression and manual resistance into anterior elevation of pelvis, with hip flexion, hip adduction and ER and ankle DF    (Used abbreviations: MET - muscle energy technique; PNF - proprioceptive neuromuscular facilitation; NMR - neuromuscular re-education; a/p - anterior to posterior; p/a - posterior to anterior, FMP - functional movement patterns)    Treatment/Session Assessment:    · Response to Treatment: improving overall mobility noted in anterior pelvis and improved activation of RLE. · Compliance with Program/Exercises: compliant all of the time. · Recommendations/Intent for next treatment session: \"Next visit will focus on advancements to more challenging activities\".   Total Treatment Duration:  PT Patient Time In/Time Out  Time In: 0800  Time Out: 0830    Viviana Hnana PT

## 2017-11-02 ENCOUNTER — APPOINTMENT (OUTPATIENT)
Dept: PHYSICAL THERAPY | Age: 37
End: 2017-11-02
Payer: COMMERCIAL

## 2017-11-16 ENCOUNTER — HOSPITAL ENCOUNTER (OUTPATIENT)
Dept: PHYSICAL THERAPY | Age: 37
Discharge: HOME | End: 2017-11-16
Payer: COMMERCIAL

## 2017-11-16 PROCEDURE — 97110 THERAPEUTIC EXERCISES: CPT

## 2017-11-16 PROCEDURE — 97140 MANUAL THERAPY 1/> REGIONS: CPT

## 2017-11-16 NOTE — PROGRESS NOTES
Isma Forte  : 1980 [facility] [address]  [phone]   [phone]        OUTPATIENT PHYSICAL THERAPY:Daily Note 2017    ICD-10: Treatment Diagnosis: low back pain M54.5  ICD-10: Treatment Diagnosis: difficulty walking R26.2  ICD-10: Treatment Diagnosis: stiffness in joint; right foot M25.674  Precautions/Allergies:   Sulfa (sulfonamide antibiotics)   Fall Risk Score: 0 (? 5 = High Risk)  MD Orders: self referred MEDICAL/REFERRING DIAGNOSIS:  Foot pain [M79.673]   DATE OF ONSET: chronic  REFERRING PHYSICIAN: Latosha Rees MD  RETURN PHYSICIAN APPOINTMENT: as needed     RE-ASSESSMENT:  Ms. Deejay Mcbride presents to physical therapy with history of low back pain/right buttock pain and antalgic gait. She presents with limitations in RLE ROM, especially into her foot and ankle, with limited dorsiflexion. She has a history of a cavernous brainstem resulting in challenges with balance and gait, however patient is very motivated and demonstrates great strength through UE and LE. She would benefit from skilled physical therapy to improve her overall mobility, stability, balance and neuromuscular facilitation. 17: Patient has demonstrates improved mobility with gait and increasing strength and activation of core and gluteal muscles. She continues to benefit from tool assisted soft tissue techniques to improve overall mobility through her RLE. She has used an AFO twice in therapy sessions and notes significant improved gait and no loss of balance. She would benefit from an AFO for daily use to help with ambulation, stairs and driving. She would benefit from continued physical therapy to continues to improve pelvic mobility and improve motor control through her pelvis and LE.   10-9-17: Angel Gaetano has continued to improve overall mobility through LE.  She is ambulating better with her new AFO however notes increased soreness in RLE musculature secondary to new LE patterns. She continues to have low back pain and right buttocks pain. She would benefit from continued physical therapy to improve overall mobility, stability and endurance through LE, pelvis and lumbar spine. PROBLEM LIST (Impacting functional limitations):  1. Decreased Strength  2. Decreased ADL/Functional Activities  3. Decreased Ambulation Ability/Technique  4. Decreased Balance INTERVENTIONS PLANNED:  1. Balance Exercise  2. Home Exercise Program (HEP)  3. Manual Therapy  4. Neuromuscular Re-education/Strengthening  5. Range of Motion (ROM)  6. Therapeutic Activites  7. Therapeutic Exercise/Strengthening   TREATMENT PLAN:  Effective Dates: 10/9/17 TO 1/1/18  Frequency/Duration: 1x a week for 12 weeks  GOALS: (Goals have been discussed and agreed upon with patient.)  Discharge Goals: Time Frame: 4 weeks  1. Patient demonstrates improved balance with ability to stand for 60 seconds single leg without loss of balance. - ONGOING  2. Patient able to to demonstrates improved pelvic mobility with gait, for 300' without verbal cueing or resistance from therapist. - ALMOST MET  3. Patient demonstrated ability to run 2 miles without onset of discomfort or loss of balance. - ONGOING  4. Patient able to perform balance exercises for 15 minutes with no more than 3 loss of balances. ALMOST MET  Rehabilitation Potential For Stated Goals: Excellent                    The information in this section was collected on 7/19/17 (except where otherwise noted). HISTORY:   History of Present Injury/Illness (Reason for Referral):  Patient has history of a cavernous brainstem, history of stroke affecting her right side. She has a chronic history of intermittent low back pain and challenges with mobility into right foot/ankle. She is very motivated and performs daily exercises and strength training to improve her overall mobility and function.  She has decided she wants to try out to join the [place] and to date has passed 99% of her testing. :Unfortunately she was denied by the the [employer], however she continues to want to improve her overall strength and balance. She has also expressed interest in obtaining a new AFO. Patient denies dizziness, drop attacks, numbness/tingling, bowel/bladder dysfunction and/or unexplained weight gain/loss. Past Medical History/Comorbidities:   Ms. Abhishek Dowilng  has a past medical history of Stroke [facility]). She also has no past medical history of Abnormal glandular Papanicolaou smear of cervix; Acquired hypothyroidism; Adult physical abuse; Anemia NEC; Asthma; Chronic kidney disease, unspecified; Diabetes mellitus; Essential hypertension; Female infertility of unspecified origin; Heart abnormalities; Herpes simplex without mention of complication; Human immunodeficiency virus (HIV) disease ([facility] OTHER MEDICAL; Liver disease; Phlebitis and thrombophlebitis of unspecified site; Postpartum depression; Psychiatric problem; Rhesus isoimmunization unspecified as to episode of care in pregnancy; Shock due to anesthesia not elsewhere classified; Sickle-cell disease, unspecified ([facility] [address] Systemic lupus erythematosus ([address] Unspecified breast disorder; Unspecified diseases of blood and blood-forming organs; or Unspecified epilepsy without mention of intractable epilepsy ([facility] [address] Ms. Abhishek Dowling  has a past surgical history that includes neurological procedure unlisted. Social History/Living Environment:     Lives in a private home with her  and two children  Prior Level of Function/Work/Activity:  Patient is very active with daily workouts and running. She works full time with [employer].   Dominant Side:         BILATERAL  Current Medications:       Current Outpatient Prescriptions:     ibuprofen (MOTRIN) 400 mg tablet, take 1 Tab by mouth every four (4) hours as needed for Pain., Disp: 30 Tab, Rfl: 1     Date Last Reviewed:  11/16/2017 Number of Personal Factors/Comorbidities that affect the Plan of Care: 1-2: MODERATE COMPLEXITY   EXAMINATION:   Observation/Orthostatic Postural Assessment:          Patient denies any LE pain, paresthesia or symptoms. Patient denies any increase of symptoms with cough, sneeze or valsalva. Patient denies any saddle paresthesia or bowel/bladder deficits. Observation/Orthostatic Postural Assessment: Patient exhibits a increased lumbar lordosis and convex right type I curve in mid-thoracic spine. Lower extremity weight bearing is slight increased left. Observation of gait indicates challenges with pelvic mobility  (patient is a pelvic/hip walker versus an efficient trunk walker). Lower quadrant bony landmarks are right elevated compared to left. Leg swing for innominate mobility indicates bilateral limitations into innominate extension. Vertical compression test (VCT) for alignment is 4. Elbow flexion test (EFT) for motor activation is 4. Soft tissue observation indicates restrictions noted in bilateral iliopsoas, hamstrings, right greater than left, piriformis right greater than left. improving  Palpation/Tissue, Texture, Tension, Tonal and Length Abnormalities: Myofascial assessment indicates restrictions into thoracolumbar fascia. Muscle length testing in modified Harley position exhibits restricted right greater than left improving . Hamstring flexibility tested supine with straight leg raise (SLR) is restricted right greater than left. Piriformis length is restricted right greater than left improving. Quadriceps flexibility tested prone with heel to buttock is moderate restrictions right. Ely test is positive for rectus femoris tightness improving. Gastrocnemius and soleus flexibility are restricted right. Sacrotuberous ligament is right restrictions. Sacrococcygeal junction exhibits right rotated. Body of coccyx is flexed.   improving  ROM: Passive trunk rotation is 90% available to the R and 90% available to the L. Kinetic testing in standing including forward bend test is positive left. Marcher's test is positive left. Pelvic shear test is standing exhibits decreased excursion of bilateral shear with a hard end feel. Single plane active movements through lumbar spine in standing exhibit WFL, tightness into hamstrings, right greater than left. improving Functional squat for LE clearance is WFL. AROM (PROM) Right Left   Hip flexion/Innominate flexion 90 90   Hip extension/Innominate extension 8 8   Hip external rotation (ER) 25 25   Hip internal rotation (IR) 20 20   Hip abduction 45 45   Strength: Lumbar protective mechanism (LPM) are present for initiation. LPM Strength */5   R anterior to posterior diagonal 3+   L anterior to posterior diagonal 3+   R posterior to anterior diagonal 4+   L posterior to anterior diagonal 4+     Manual Muscle Test (*/5) Right Left   Knee extension 5 5   Knee flexion 5 5   Hip flexion 4 5   Hip ER 4 5   Hip IR 4 5   Hip extension 4 4   Hip abduction 4 4   Hip adduction 5 5   Ankle DF 4- 5   Ankle PF 5 5   Core stability 4/5     Special Tests:  Hiral Frizzle test is negative. Scours test is negative  Neurological Screen:  Myotomes: Key muscle strength testing through bilateral LE is intact. Dermatomes: Sensation testing through bilateral lower quadrants for light touch is intact. Reflexes: Patellar (L4) and Achilles (S1) are 2+ and WFL. Neural tension tests: Passive straight leg raise (SLR) test is negative. Slump test is negative. Functional Mobility:  Overall demonstrates good mobility with functional tasks, challenged with balance in single limb stance  RLE: 15 seconds hold, LLE 40 seconds hold     Body Structures Involved:  1. Nerves  2. Joints  3. Muscles Body Functions Affected:  1. Sensory/Pain  2. Neuromusculoskeletal  3. Movement Related Activities and Participation Affected:  1. Mobility  2.  Community, Social and [place]ham Marshall   Number of elements (examined above) that affect the Plan of Care: 4+: HIGH COMPLEXITY   CLINICAL PRESENTATION:   Presentation: Evolving clinical presentation with changing clinical characteristics: MODERATE COMPLEXITY   CLINICAL DECISION MAKING:   Outcome Measure: Tool Used: Modified Oswestry Low Back Pain Questionnaire  Score:  Initial: 3/50  Most Recent: 2/50 (Date: 10-9-17 )   Interpretation of Score: Each section is scored on a 0-5 scale, 5 representing the greatest disability. The scores of each section are added together for a total score of 50. Medical Necessity:   · Patient is expected to demonstrate progress in strength, range of motion, balance, coordination and functional technique to increase independence with ambulation and balance to prepare for her testing for the [facility] Reason for Services/Other Comments:  · Patient continues to require skilled intervention due to she would benefit from additional training in endurance of RLE and lumbar spine  and to improve overall mobility, stability and balance. .   Use of outcome tool(s) and clinical judgement create a POC that gives a: Questionable prediction of patient's progress: MODERATE COMPLEXITY            TREATMENT:   (In addition to Assessment/Re-Assessment sessions the following treatments were rendered)  Pre-treatment Symptoms/Complaints:  Patient notes less pain through anterior hip/pelvis, increased work outs with less discomfort in lumbar spine and buttocks. Continues to have tightness in right hamstrings. Pain: Initial:   Pain Intensity 1: 3  Pain Location 1: Hip  Pain Orientation 1: Right  Post Session:  2/10     Therapeutic Exercise: (10 Minutes):  Exercises per grid below to improve mobility, strength, balance and coordination. Required minimal verbal and manual cues to promote proper body alignment, promote proper body posture, promote proper body mechanics and promote proper body breathing techniques.   Progressed resistance, range, repetitions and complexity of movement as indicated. Date:  11/16/2017   Activity/Exercise Parameters   Balance exercises    Single limb stance     Quadriped posterior depression X 5 reps 10 sec holds BLE   ambulation    Review HEP X 5 minutes review   Side lying left - right hip extension    Side lying for right anterior elevation and posterior depression of pelvis X 10 reps, 10 sec holds   Prone hip ER/IR strengthening X 15 reps, 5 sec holds green band   Side lying hip ER/IR strengthening    Abdominal bracing        Manual Therapy (    Soft Tissue Mobilization Duration  Duration: 50 Minutes): Manual techniques to facilitate improved motion and decreased pain. · Supine right iliopsoas release with FMP of lower trunk rotation  · Supine right inferior glide of pubic ramus with NMR following. · Supine strumming of iliopsoas along inguinal ligament, with FMP of knee extension. · Supine right hamstrings, soft tissue mobilization right greater than left  · Supine right ankle subtalar distraction, manual mobilization into ankle DF with manual resistance  · With written consent received and precautions reviewed, instrument-assisted soft tissue mobilization was performed to right anterior tibialis and hamstrings for the purpose of activation with a positive treatment effect and no complications noted. · Side lying left for right prolonged holds into posterior depression and manual resistance into anterior elevation of pelvis, with hip flexion, hip adduction and ER and ankle DF    (Used abbreviations: MET - muscle energy technique; PNF - proprioceptive neuromuscular facilitation; NMR - neuromuscular re-education; a/p - anterior to posterior; p/a - posterior to anterior, FMP - functional movement patterns)    Treatment/Session Assessment:    · Response to Treatment: improved right hamstrings flexibility, improved mobility of right pelvis and innominate flexion after pubic ramus mobilization.    · Compliance with Program/Exercises: compliant all of the time.  · Recommendations/Intent for next treatment session: \"Next visit will focus on advancements to more challenging activities\".   Total Treatment Duration:  PT Patient Time In/Time Out  Time In: 0830  Time Out: [address] Mariposa Schuster PT

## 2017-11-21 ENCOUNTER — HOSPITAL ENCOUNTER (OUTPATIENT)
Dept: PHYSICAL THERAPY | Age: 37
Discharge: HOME | End: 2017-11-21
Payer: COMMERCIAL

## 2017-11-21 PROCEDURE — 97140 MANUAL THERAPY 1/> REGIONS: CPT

## 2017-11-21 PROCEDURE — 97110 THERAPEUTIC EXERCISES: CPT

## 2017-11-22 NOTE — PROGRESS NOTES
[facility]  : [facility] [address]  [phone]   [phone]        OUTPATIENT PHYSICAL THERAPY:Daily Note 2017    ICD-10: Treatment Diagnosis: low back pain M54.5  ICD-10: Treatment Diagnosis: difficulty walking R26.2  ICD-10: Treatment Diagnosis: stiffness in joint; right foot M25.674  Precautions/Allergies:   Sulfa (sulfonamide antibiotics)   Fall Risk Score: 0 (? 5 = High Risk)  MD Orders: self referred MEDICAL/REFERRING DIAGNOSIS:  Foot pain [M79.673]   DATE OF ONSET: chronic  REFERRING PHYSICIAN: Vianca Sherman MD  RETURN PHYSICIAN APPOINTMENT: as needed     RE-ASSESSMENT:  Ms. Ivan Nguyen presents to physical therapy with history of low back pain/right buttock pain and antalgic gait. She presents with limitations in RLE ROM, especially into her foot and ankle, with limited dorsiflexion. She has a history of a cavernous brainstem resulting in challenges with balance and gait, however patient is very motivated and demonstrates great strength through UE and LE. She would benefit from skilled physical therapy to improve her overall mobility, stability, balance and neuromuscular facilitation. 17: Patient has demonstrates improved mobility with gait and increasing strength and activation of core and gluteal muscles. She continues to benefit from tool assisted soft tissue techniques to improve overall mobility through her RLE. She has used an AFO twice in therapy sessions and notes significant improved gait and no loss of balance. She would benefit from an AFO for daily use to help with ambulation, stairs and driving. She would benefit from continued physical therapy to continues to improve pelvic mobility and improve motor control through her pelvis and LE.   10-9-17: St. chin has continued to improve overall mobility through LE.  She is ambulating better with her new AFO however notes increased soreness in RLE musculature secondary to new LE patterns. She continues to have low back pain and right buttocks pain. She would benefit from continued physical therapy to improve overall mobility, stability and endurance through LE, pelvis and lumbar spine. PROBLEM LIST (Impacting functional limitations):  1. Decreased Strength  2. Decreased ADL/Functional Activities  3. Decreased Ambulation Ability/Technique  4. Decreased Balance INTERVENTIONS PLANNED:  1. Balance Exercise  2. Home Exercise Program (HEP)  3. Manual Therapy  4. Neuromuscular Re-education/Strengthening  5. Range of Motion (ROM)  6. Therapeutic Activites  7. Therapeutic Exercise/Strengthening   TREATMENT PLAN:  Effective Dates: 10/9/17 TO 1/1/18  Frequency/Duration: 1x a week for 12 weeks  GOALS: (Goals have been discussed and agreed upon with patient.)  Discharge Goals: Time Frame: 4 weeks  1. Patient demonstrates improved balance with ability to stand for 60 seconds single leg without loss of balance. - ONGOING  2. Patient able to to demonstrates improved pelvic mobility with gait, for 300' without verbal cueing or resistance from therapist. - ALMOST MET  3. Patient demonstrated ability to run 2 miles without onset of discomfort or loss of balance. - ONGOING  4. Patient able to perform balance exercises for 15 minutes with no more than 3 loss of balances. ALMOST MET  Rehabilitation Potential For Stated Goals: Excellent                    The information in this section was collected on 7/19/17 (except where otherwise noted). HISTORY:   History of Present Injury/Illness (Reason for Referral):  Patient has history of a cavernous brainstem, history of stroke affecting her right side. She has a chronic history of intermittent low back pain and challenges with mobility into right foot/ankle. She is very motivated and performs daily exercises and strength training to improve her overall mobility and function.  She has decided she wants to try out to [facility] and to date has passed 99% of her testing. :Unfortunately she was denied by the the [employer] reserves, however she continues to want to improve her overall strength and balance. She has also expressed interest in obtaining a new AFO. Patient denies dizziness, drop attacks, numbness/tingling, bowel/bladder dysfunction and/or unexplained weight gain/loss. Past Medical History/Comorbidities:   Ms. Fabrice Thompson  has a past medical history of Stroke [facility]). She also has no past medical history of Abnormal glandular Papanicolaou smear of cervix; Acquired hypothyroidism; Adult physical abuse; Anemia NEC; Asthma; Chronic kidney disease, unspecified; Diabetes mellitus; Essential hypertension; Female infertility of unspecified origin; Heart abnormalities; Herpes simplex without mention of complication; Human immunodeficiency virus (HIV) disease ([facility] [address] OTHER MEDICAL; Liver disease; Phlebitis and thrombophlebitis of unspecified site; Postpartum depression; Psychiatric problem; Rhesus isoimmunization unspecified as to episode of care in pregnancy; Shock due to anesthesia not elsewhere classified; Sickle-cell disease, unspecified ([address] Systemic lupus erythematosus ([address] Unspecified breast disorder; Unspecified diseases of blood and blood-forming organs; or Unspecified epilepsy without mention of intractable epilepsy ([facility] [address] Ms. Fabrice Thompson  has a past surgical history that includes neurological procedure unlisted. Social History/Living Environment:     Lives in a private home with her  and two children  Prior Level of Function/Work/Activity:  Patient is very active with daily workouts and running. She works full time with [employer].   Dominant Side:         BILATERAL  Current Medications:       Current Outpatient Prescriptions:     ibuprofen (MOTRIN) 400 mg tablet, take 1 Tab by mouth every four (4) hours as needed for Pain., Disp: 30 Tab, Rfl: 1     Date Last Reviewed:  11/21/2017 Number of Personal Factors/Comorbidities that affect the Plan of Care: 1-2: MODERATE COMPLEXITY   EXAMINATION:   Observation/Orthostatic Postural Assessment:          Patient denies any LE pain, paresthesia or symptoms. Patient denies any increase of symptoms with cough, sneeze or valsalva. Patient denies any saddle paresthesia or bowel/bladder deficits. Observation/Orthostatic Postural Assessment: Patient exhibits a increased lumbar lordosis and convex right type I curve in mid-thoracic spine. Lower extremity weight bearing is slight increased left. Observation of gait indicates challenges with pelvic mobility  (patient is a pelvic/hip walker versus an efficient trunk walker). Lower quadrant bony landmarks are right elevated compared to left. Leg swing for innominate mobility indicates bilateral limitations into innominate extension. Vertical compression test (VCT) for alignment is 4. Elbow flexion test (EFT) for motor activation is 4. Soft tissue observation indicates restrictions noted in bilateral iliopsoas, hamstrings, right greater than left, piriformis right greater than left. improving  Palpation/Tissue, Texture, Tension, Tonal and Length Abnormalities: Myofascial assessment indicates restrictions into thoracolumbar fascia. Muscle length testing in modified Harley position exhibits restricted right greater than left improving . Hamstring flexibility tested supine with straight leg raise (SLR) is restricted right greater than left. Piriformis length is restricted right greater than left improving. Quadriceps flexibility tested prone with heel to buttock is moderate restrictions right. Ely test is positive for rectus femoris tightness improving. Gastrocnemius and soleus flexibility are restricted right. Sacrotuberous ligament is right restrictions. Sacrococcygeal junction exhibits right rotated. Body of coccyx is flexed.   improving  ROM: Passive trunk rotation is 90% available to the R and 90% available to the L. Kinetic testing in standing including forward bend test is positive left. Marcher's test is positive left. Pelvic shear test is standing exhibits decreased excursion of bilateral shear with a hard end feel. Single plane active movements through lumbar spine in standing exhibit WFL, tightness into hamstrings, right greater than left. improving Functional squat for LE clearance is WFL. AROM (PROM) Right Left   Hip flexion/Innominate flexion 90 90   Hip extension/Innominate extension 8 8   Hip external rotation (ER) 25 25   Hip internal rotation (IR) 20 20   Hip abduction 45 45   Strength: Lumbar protective mechanism (LPM) are present for initiation. LPM Strength */5   R anterior to posterior diagonal 3+   L anterior to posterior diagonal 3+   R posterior to anterior diagonal 4+   L posterior to anterior diagonal 4+     Manual Muscle Test (*/5) Right Left   Knee extension 5 5   Knee flexion 5 5   Hip flexion 4 5   Hip ER 4 5   Hip IR 4 5   Hip extension 4 4   Hip abduction 4 4   Hip adduction 5 5   Ankle DF 4- 5   Ankle PF 5 5   Core stability 4/5     Special Tests:  Antonino Hurter test is negative. Scours test is negative  Neurological Screen:  Myotomes: Key muscle strength testing through bilateral LE is intact. Dermatomes: Sensation testing through bilateral lower quadrants for light touch is intact. Reflexes: Patellar (L4) and Achilles (S1) are 2+ and WFL. Neural tension tests: Passive straight leg raise (SLR) test is negative. Slump test is negative. Functional Mobility:  Overall demonstrates good mobility with functional tasks, challenged with balance in single limb stance  RLE: 15 seconds hold, LLE 40 seconds hold     Body Structures Involved:  1. Nerves  2. Joints  3. Muscles Body Functions Affected:  1. Sensory/Pain  2. Neuromusculoskeletal  3. Movement Related Activities and Participation Affected:  1. Mobility  2.  Community, Social and [place]   Number of elements (examined above) that affect the Plan of Care: 4+: HIGH COMPLEXITY   CLINICAL PRESENTATION:   Presentation: Evolving clinical presentation with changing clinical characteristics: MODERATE COMPLEXITY   CLINICAL DECISION MAKING:   Outcome Measure: Tool Used: Modified Oswestry Low Back Pain Questionnaire  Score:  Initial: 3/50  Most Recent: 2/50 (Date: 10-9-17 )   Interpretation of Score: Each section is scored on a 0-5 scale, 5 representing the greatest disability. The scores of each section are added together for a total score of 50. Medical Necessity:   · Patient is expected to demonstrate progress in strength, range of motion, balance, coordination and functional technique to increase independence with ambulation and balance to prepare for her testing for the [facility] Reason for Services/Other Comments:  · Patient continues to require skilled intervention due to she would benefit from additional training in endurance of RLE and lumbar spine  and to improve overall mobility, stability and balance. .   Use of outcome tool(s) and clinical judgement create a POC that gives a: Questionable prediction of patient's progress: MODERATE COMPLEXITY            TREATMENT:   (In addition to Assessment/Re-Assessment sessions the following treatments were rendered)  Pre-treatment Symptoms/Complaints:  Patient notes improved mobility through RLE after last session, noted less tightness in right hamstrings and noted improved gait. Pain: Initial:   Pain Intensity 1: 3  Pain Location 1: Hip, Spine, lumbar  Pain Orientation 1: Right  Post Session:  2/10     Therapeutic Exercise: (10 Minutes):  Exercises per grid below to improve mobility, strength, balance and coordination. Required minimal verbal and manual cues to promote proper body alignment, promote proper body posture, promote proper body mechanics and promote proper body breathing techniques. Progressed resistance, range, repetitions and complexity of movement as indicated. Date:  11/21/2017   Activity/Exercise Parameters   Balance exercises    Single limb stance     Quadriped posterior depression X 5 reps 10 sec holds BLE   ambulation    Review HEP X 5 minutes review   Side lying left - right hip extension    Side lying for right anterior elevation and posterior depression of pelvis X 10 reps, 10 sec holds   Prone hip ER/IR strengthening X 15 reps, 5 sec holds green band   Side lying hip ER/IR strengthening    Abdominal bracing X 10 reps 10 sec holds UE/LE single and repeated bilateral 90/90 holds       Manual Therapy (    Soft Tissue Mobilization Duration  Duration: 45 Minutes): Manual techniques to facilitate improved motion and decreased pain. · Supine right iliopsoas release with FMP of lower trunk rotation  · Supine right inferior glide of pubic ramus with NMR following. · Supine strumming of iliopsoas along inguinal ligament, with FMP of knee extension. · Supine right hamstrings, soft tissue mobilization right greater than left  · Supine right ankle subtalar distraction, manual mobilization into ankle DF with manual resistance  · With written consent received and precautions reviewed, instrument-assisted soft tissue mobilization was performed to right anterior tibialis and hamstrings, gluteus medius/minimus for the purpose of activation with a positive treatment effect and no complications noted. · Side lying left for right prolonged holds into posterior depression and manual resistance into anterior elevation of pelvis, with hip flexion, hip adduction and ER and ankle DF    (Used abbreviations: MET - muscle energy technique; PNF - proprioceptive neuromuscular facilitation; NMR - neuromuscular re-education; a/p - anterior to posterior; p/a - posterior to anterior, FMP - functional movement patterns)    Treatment/Session Assessment:    · Response to Treatment: improved mobility through right LE with improved gait and stability.    · Compliance with Program/Exercises: compliant all of the time. · Recommendations/Intent for next treatment session: \"Next visit will focus on advancements to more challenging activities\".   Total Treatment Duration:  PT Patient Time In/Time Out  Time In: 1435  Time Out: [address] Lazaro Goodell, PT

## 2017-12-11 ENCOUNTER — HOSPITAL ENCOUNTER (OUTPATIENT)
Dept: PHYSICAL THERAPY | Age: 37
Discharge: HOME | End: 2017-12-11
Payer: COMMERCIAL

## 2017-12-11 PROCEDURE — 97140 MANUAL THERAPY 1/> REGIONS: CPT

## 2017-12-11 PROCEDURE — 97110 THERAPEUTIC EXERCISES: CPT

## 2017-12-11 NOTE — PROGRESS NOTES
Margarita Forte  : 1980 [facility] [address]  [phone]   [phone]        OUTPATIENT PHYSICAL THERAPY:Daily Note 2017    ICD-10: Treatment Diagnosis: low back pain M54.5  ICD-10: Treatment Diagnosis: difficulty walking R26.2  ICD-10: Treatment Diagnosis: stiffness in joint; right foot M25.674  Precautions/Allergies:   Sulfa (sulfonamide antibiotics)   Fall Risk Score: 0 (? 5 = High Risk)  MD Orders: self referred MEDICAL/REFERRING DIAGNOSIS:  Foot pain [M79.673]   DATE OF ONSET: chronic  REFERRING PHYSICIAN: Cindy Villegas MD  RETURN PHYSICIAN APPOINTMENT: as needed     RE-ASSESSMENT:  Ms. Bean Lee presents to physical therapy with history of low back pain/right buttock pain and antalgic gait. She presents with limitations in RLE ROM, especially into her foot and ankle, with limited dorsiflexion. She has a history of a cavernous brainstem resulting in challenges with balance and gait, however patient is very motivated and demonstrates great strength through UE and LE. She would benefit from skilled physical therapy to improve her overall mobility, stability, balance and neuromuscular facilitation. 17: Patient has demonstrates improved mobility with gait and increasing strength and activation of core and gluteal muscles. She continues to benefit from tool assisted soft tissue techniques to improve overall mobility through her RLE. She has used an AFO twice in therapy sessions and notes significant improved gait and no loss of balance. She would benefit from an AFO for daily use to help with ambulation, stairs and driving. She would benefit from continued physical therapy to continues to improve pelvic mobility and improve motor control through her pelvis and LE.   10-9-17: Luz Elena Red has continued to improve overall mobility through LE.  She is ambulating better with her new AFO however notes increased soreness in RLE musculature secondary to new LE patterns. She continues to have low back pain and right buttocks pain. She would benefit from continued physical therapy to improve overall mobility, stability and endurance through LE, pelvis and lumbar spine. PROBLEM LIST (Impacting functional limitations):  1. Decreased Strength  2. Decreased ADL/Functional Activities  3. Decreased Ambulation Ability/Technique  4. Decreased Balance INTERVENTIONS PLANNED:  1. Balance Exercise  2. Home Exercise Program (HEP)  3. Manual Therapy  4. Neuromuscular Re-education/Strengthening  5. Range of Motion (ROM)  6. Therapeutic Activites  7. Therapeutic Exercise/Strengthening   TREATMENT PLAN:  Effective Dates: 10/9/17 TO 1/1/18  Frequency/Duration: 1x a week for 12 weeks  GOALS: (Goals have been discussed and agreed upon with patient.)  Discharge Goals: Time Frame: 4 weeks  1. Patient demonstrates improved balance with ability to stand for 60 seconds single leg without loss of balance. - ONGOING  2. Patient able to to demonstrates improved pelvic mobility with gait, for 300' without verbal cueing or resistance from therapist. - ALMOST MET  3. Patient demonstrated ability to run 2 miles without onset of discomfort or loss of balance. - ONGOING  4. Patient able to perform balance exercises for 15 minutes with no more than 3 loss of balances. ALMOST MET  Rehabilitation Potential For Stated Goals: Excellent                    The information in this section was collected on 7/19/17 (except where otherwise noted). HISTORY:   History of Present Injury/Illness (Reason for Referral):  Patient has history of a cavernous brainstem, history of stroke affecting her right side. She has a chronic history of intermittent low back pain and challenges with mobility into right foot/ankle. She is very motivated and performs daily exercises and strength training to improve her overall mobility and function.  She has decided she wants to try out to join the [employer] and to date has passed 99% of her testing. :Unfortunately she was denied by the the [employer], however she continues to want to improve her overall strength and balance. She has also expressed interest in obtaining a new AFO. Patient denies dizziness, drop attacks, numbness/tingling, bowel/bladder dysfunction and/or unexplained weight gain/loss. Past Medical History/Comorbidities:   Ms. Shayy Betts  has a past medical history of Stroke [facility]). She also has no past medical history of Abnormal glandular Papanicolaou smear of cervix; Acquired hypothyroidism; Adult physical abuse; Anemia NEC; Asthma; Chronic kidney disease, unspecified; Diabetes mellitus; Essential hypertension; Female infertility of unspecified origin; Heart abnormalities; Herpes simplex without mention of complication; Human immunodeficiency virus (HIV) disease ([facility] OTHER MEDICAL; Liver disease; Phlebitis and thrombophlebitis of unspecified site; Postpartum depression; Psychiatric problem; Rhesus isoimmunization unspecified as to episode of care in pregnancy; Shock due to anesthesia not elsewhere classified; Sickle-cell disease, unspecified ([address] Systemic lupus erythematosus ([address] Unspecified breast disorder; Unspecified diseases of blood and blood-forming organs; or Unspecified epilepsy without mention of intractable epilepsy ([facility] Ms. Shayy Betts  has a past surgical history that includes neurological procedure unlisted. Social History/Living Environment:     Lives in a private home with her  and two children  Prior Level of Function/Work/Activity:  Patient is very active with daily workouts and running. She works full time with [employer].   Dominant Side:         BILATERAL  Current Medications:       Current Outpatient Prescriptions:     ibuprofen (MOTRIN) 400 mg tablet, take 1 Tab by mouth every four (4) hours as needed for Pain., Disp: 30 Tab, Rfl: 1     Date Last Reviewed:  12/11/2017 Number of Personal Factors/Comorbidities that affect the Plan of Care: 1-2: MODERATE COMPLEXITY   EXAMINATION:   Observation/Orthostatic Postural Assessment:          Patient denies any LE pain, paresthesia or symptoms. Patient denies any increase of symptoms with cough, sneeze or valsalva. Patient denies any saddle paresthesia or bowel/bladder deficits. Observation/Orthostatic Postural Assessment: Patient exhibits a increased lumbar lordosis and convex right type I curve in mid-thoracic spine. Lower extremity weight bearing is slight increased left. Observation of gait indicates challenges with pelvic mobility  (patient is a pelvic/hip walker versus an efficient trunk walker). Lower quadrant bony landmarks are right elevated compared to left. Leg swing for innominate mobility indicates bilateral limitations into innominate extension. Vertical compression test (VCT) for alignment is 4. Elbow flexion test (EFT) for motor activation is 4. Soft tissue observation indicates restrictions noted in bilateral iliopsoas, hamstrings, right greater than left, piriformis right greater than left. improving  Palpation/Tissue, Texture, Tension, Tonal and Length Abnormalities: Myofascial assessment indicates restrictions into thoracolumbar fascia. Muscle length testing in modified Harley position exhibits restricted right greater than left improving . Hamstring flexibility tested supine with straight leg raise (SLR) is restricted right greater than left. Piriformis length is restricted right greater than left improving. Quadriceps flexibility tested prone with heel to buttock is moderate restrictions right. Ely test is positive for rectus femoris tightness improving. Gastrocnemius and soleus flexibility are restricted right. Sacrotuberous ligament is right restrictions. Sacrococcygeal junction exhibits right rotated. Body of coccyx is flexed.   improving  ROM: Passive trunk rotation is 90% available to the R and 90% available to the L. Kinetic testing in standing including forward bend test is positive left. Marcher's test is positive left. Pelvic shear test is standing exhibits decreased excursion of bilateral shear with a hard end feel. Single plane active movements through lumbar spine in standing exhibit WFL, tightness into hamstrings, right greater than left. improving Functional squat for LE clearance is WFL. AROM (PROM) Right Left   Hip flexion/Innominate flexion 90 90   Hip extension/Innominate extension 8 8   Hip external rotation (ER) 25 25   Hip internal rotation (IR) 20 20   Hip abduction 45 45   Strength: Lumbar protective mechanism (LPM) are present for initiation. LPM Strength */5   R anterior to posterior diagonal 3+   L anterior to posterior diagonal 3+   R posterior to anterior diagonal 4+   L posterior to anterior diagonal 4+     Manual Muscle Test (*/5) Right Left   Knee extension 5 5   Knee flexion 5 5   Hip flexion 4 5   Hip ER 4 5   Hip IR 4 5   Hip extension 4 4   Hip abduction 4 4   Hip adduction 5 5   Ankle DF 4- 5   Ankle PF 5 5   Core stability 4/5     Special Tests:  Maureen [place] test is negative. Scours test is negative  Neurological Screen:  Myotomes: Key muscle strength testing through bilateral LE is intact. Dermatomes: Sensation testing through bilateral lower quadrants for light touch is intact. Reflexes: Patellar (L4) and Achilles (S1) are 2+ and WFL. Neural tension tests: Passive straight leg raise (SLR) test is negative. Slump test is negative. Functional Mobility:  Overall demonstrates good mobility with functional tasks, challenged with balance in single limb stance  RLE: 15 seconds hold, LLE 40 seconds hold     Body Structures Involved:  1. Nerves  2. Joints  3. Muscles Body Functions Affected:  1. Sensory/Pain  2. Neuromusculoskeletal  3. Movement Related Activities and Participation Affected:  1. Mobility  2.  Community, Social and [place]   Number of elements (examined above) that affect the Plan of Care: 4+: HIGH COMPLEXITY   CLINICAL PRESENTATION:   Presentation: Evolving clinical presentation with changing clinical characteristics: MODERATE COMPLEXITY   CLINICAL DECISION MAKING:   Outcome Measure: Tool Used: Modified Oswestry Low Back Pain Questionnaire  Score:  Initial: 3/50  Most Recent: 2/50 (Date: 10-9-17 )   Interpretation of Score: Each section is scored on a 0-5 scale, 5 representing the greatest disability. The scores of each section are added together for a total score of 50. Medical Necessity:   · Patient is expected to demonstrate progress in strength, range of motion, balance, coordination and functional technique to increase independence with ambulation and balance to prepare for her testing for the [facility] Reason for Services/Other Comments:  · Patient continues to require skilled intervention due to she would benefit from additional training in endurance of RLE and lumbar spine  and to improve overall mobility, stability and balance. .   Use of outcome tool(s) and clinical judgement create a POC that gives a: Questionable prediction of patient's progress: MODERATE COMPLEXITY            TREATMENT:   (In addition to Assessment/Re-Assessment sessions the following treatments were rendered)  Pre-treatment Symptoms/Complaints:  Patient notes continues to have right anterior hip/knee pain with walking and exercises. Pain: Initial:   Pain Intensity 1: 3  Pain Location 1: Hip, Spine, lumbar  Pain Orientation 1: Right  Post Session:  2/10     Therapeutic Exercise: (10 Minutes):  Exercises per grid below to improve mobility, strength, balance and coordination. Required minimal verbal and manual cues to promote proper body alignment, promote proper body posture, promote proper body mechanics and promote proper body breathing techniques. Progressed resistance, range, repetitions and complexity of movement as indicated.    Date:  12/11/2017   Activity/Exercise Parameters Balance exercises    Single limb stance     Quadriped posterior depression X 5 reps 10 sec holds BLE   ambulation    Review HEP X 5 minutes review   Side lying left - right hip extension    Side lying for right anterior elevation and posterior depression of pelvis    Prone hip ER/IR strengthening X 15 reps, 5 sec holds green band   Side lying hip ER/IR strengthening    Abdominal bracing X 10 reps 10 sec holds UE/LE single and repeated bilateral 90/90 holds       Manual Therapy (    Soft Tissue Mobilization Duration  Duration: 50 Minutes): Manual techniques to facilitate improved motion and decreased pain. · Supine right iliopsoas release with FMP of lower trunk rotation  · Supine right inferior glide of pubic ramus with NMR following. · Supine right innominate flexion mobilization with sacrum blocked, followed with NMR  · Supine strumming of iliopsoas along inguinal ligament, with FMP of knee extension. · Supine right hamstrings, soft tissue mobilization right greater than left  · Supine right ankle subtalar distraction, manual mobilization into ankle DF with manual resistance  · With written consent received and precautions reviewed, instrument-assisted soft tissue mobilization was performed to right anterior tibialis, rectus femoris and vastus lateralis and gluteus medius/minimus for the purpose of activation with a positive treatment effect and no complications noted. (Used abbreviations: MET - muscle energy technique; PNF - proprioceptive neuromuscular facilitation; NMR - neuromuscular re-education; a/p - anterior to posterior; p/a - posterior to anterior, FMP - functional movement patterns)    Treatment/Session Assessment:    · Response to Treatment: decreased restrictions in right anterior hip, improved innominate flexion and inferior glide of right pubic ramus. · Compliance with Program/Exercises: compliant all of the time. · Recommendations/Intent for next treatment session:  \"Next visit will focus on advancements to more challenging activities\".   Total Treatment Duration:  PT Patient Time In/Time Out  Time In: 8150  Time Out: [address] [facility] Susan Maurer, PT

## 2018-02-19 NOTE — THERAPY DISCHARGE
Elle Forte  : 1980 [facility] [address]  [phone]   [phone]        OUTPATIENT PHYSICAL THERAPY:Discontinuation Summary 2018    ICD-10: Treatment Diagnosis: low back pain M54.5  ICD-10: Treatment Diagnosis: difficulty walking R26.2  ICD-10: Treatment Diagnosis: stiffness in joint; right foot M25.674  Precautions/Allergies:   Sulfa (sulfonamide antibiotics)   Fall Risk Score: 0 (? 5 = High Risk)  MD Orders: self referred MEDICAL/REFERRING DIAGNOSIS:  Foot pain [M79.673]   DATE OF ONSET: chronic  REFERRING PHYSICIAN: Adria Waters MD  RETURN PHYSICIAN APPOINTMENT: as needed     RE-ASSESSMENT:  Ms. Princess Beauchamp presents to physical therapy with history of low back pain/right buttock pain and antalgic gait. She presents with limitations in RLE ROM, especially into her foot and ankle, with limited dorsiflexion. She has a history of a cavernous brainstem resulting in challenges with balance and gait, however patient is very motivated and demonstrates great strength through UE and LE. She would benefit from skilled physical therapy to improve her overall mobility, stability, balance and neuromuscular facilitation. 17: Patient has demonstrates improved mobility with gait and increasing strength and activation of core and gluteal muscles. She continues to benefit from tool assisted soft tissue techniques to improve overall mobility through her RLE. She has used an AFO twice in therapy sessions and notes significant improved gait and no loss of balance. She would benefit from an AFO for daily use to help with ambulation, stairs and driving. She would benefit from continued physical therapy to continues to improve pelvic mobility and improve motor control through her pelvis and LE.   10-9-17: Félix Sosa has continued to improve overall mobility through LE.  She is ambulating better with her new AFO however notes increased soreness in RLE musculature secondary to new LE patterns. She continues to have low back pain and right buttocks pain. She would benefit from continued physical therapy to improve overall mobility, stability and endurance through LE, pelvis and lumbar spine. 2/19/2018: Latrice Fermin has been seen in physical therapy from 6/14/17 to 12/11/17 for 19 visits. Treatment has been discontinued at this time due to patient failing to return for additional treatment and working independently on her HEP  The below goals were met prior to discontinuation. Some goals were not met due to chronic dysfunctions and continuing to work independently on her strength and flexibility. Thank you for this referral.        PROBLEM LIST (Impacting functional limitations):  1. Decreased Strength  2. Decreased ADL/Functional Activities  3. Decreased Ambulation Ability/Technique  4. Decreased Balance INTERVENTIONS PLANNED:  1. Balance Exercise  2. Home Exercise Program (HEP)  3. Manual Therapy  4. Neuromuscular Re-education/Strengthening  5. Range of Motion (ROM)  6. Therapeutic Activites  7. Therapeutic Exercise/Strengthening   TREATMENT PLAN:  Effective Dates: 10/9/17 TO 1/1/18  Frequency/Duration: 1x a week for 12 weeks  GOALS: (Goals have been discussed and agreed upon with patient.)  Discharge Goals: Time Frame: 4 weeks  1. Patient demonstrates improved balance with ability to stand for 60 seconds single leg without loss of balance. - ALMOST MET  2. Patient able to to demonstrates improved pelvic mobility with gait, for 300' without verbal cueing or resistance from therapist. - ALMOST MET  3. Patient demonstrated ability to run 2 miles without onset of discomfort or loss of balance. - NOT MET  4. Patient able to perform balance exercises for 15 minutes with no more than 3 loss of balances.  ALMOST MET  Rehabilitation Potential For Stated Goals: Excellent